# Patient Record
Sex: MALE | Race: WHITE | NOT HISPANIC OR LATINO | Employment: OTHER
[De-identification: names, ages, dates, MRNs, and addresses within clinical notes are randomized per-mention and may not be internally consistent; named-entity substitution may affect disease eponyms.]

---

## 2017-03-27 ENCOUNTER — CHARTING TRANS (OUTPATIENT)
Dept: OTHER | Age: 77
End: 2017-03-27

## 2017-03-31 ENCOUNTER — LAB SERVICES (OUTPATIENT)
Dept: OTHER | Age: 77
End: 2017-03-31

## 2017-03-31 LAB
ALBUMIN SERPL BCG-MCNC: 4.6 G/DL (ref 3.6–5.1)
ALP SERPL-CCNC: 88 U/L (ref 30–130)
ALT SERPL W/O P-5'-P-CCNC: 20 U/L (ref 17–47)
AST SERPL-CCNC: 29 U/L (ref 14–43)
BASOPHIL %: 0.4 % (ref 0–1.2)
BASOPHIL ABSOLUTE #: 0 10*3/UL (ref 0–0.1)
BILIRUB SERPL-MCNC: 1.1 MG/DL (ref 0–1.3)
BUN SERPL-MCNC: 24 MG/DL (ref 6–27)
CALCIUM SERPL-MCNC: 10.1 MG/DL (ref 8.6–10.6)
CHLORIDE SERPL-SCNC: 101 MMOL/L (ref 96–107)
CHOLEST SERPL-MCNC: 125 MG/DL (ref 140–200)
CREATININE, SERUM: 1.6 MG/DL (ref 0.6–1.6)
DIFFERENTIAL TYPE: ABNORMAL
EOSINOPHIL %: 2.3 % (ref 0–10)
EOSINOPHIL ABSOLUTE #: 0.2 10*3/UL (ref 0–0.5)
GFR SERPL CREATININE-BSD FRML MDRD: 42 ML/MIN/{1.73M2}
GFR SERPL CREATININE-BSD FRML MDRD: 51 ML/MIN/{1.73M2}
GLUCOSE P FAST SERPL-MCNC: 92 MG/DL (ref 60–100)
HCO3 SERPL-SCNC: 27 MMOL/L (ref 22–32)
HDLC SERPL-MCNC: 37 MG/DL
HEMATOCRIT: 48.6 % (ref 40–51)
HEMOGLOBIN: 16.1 G/DL (ref 13.7–17.5)
LDLC SERPL CALC-MCNC: 58 MG/DL (ref 30–100)
LYMPH PERCENT: 26.8 % (ref 20.5–51.1)
LYMPHOCYTE ABSOLUTE #: 2.6 10*3/UL (ref 1.2–3.4)
MEAN CORPUSCULAR HGB CONCENTRATION: 33.1 % (ref 32–36)
MEAN CORPUSCULAR HGB: 31.1 PG (ref 27–34)
MEAN CORPUSCULAR VOLUME: 93.8 FL (ref 79–95)
MEAN PLATELET VOLUME: 10.1 FL (ref 8.6–12.4)
MONOCYTE ABSOLUTE #: 1 10*3/UL (ref 0.2–0.9)
MONOCYTE PERCENT: 9.9 % (ref 4.3–12.9)
NEUTROPHIL ABSOLUTE #: 5.8 10*3/UL (ref 1.4–6.5)
NEUTROPHIL PERCENT: 60.6 % (ref 34–73.5)
PLATELET COUNT: 216 10*3/UL (ref 150–400)
POTASSIUM SERPL-SCNC: 4.6 MMOL/L (ref 3.5–5.3)
PROT SERPL-MCNC: 7.4 G/DL (ref 6.4–8.5)
RED BLOOD CELL COUNT: 5.18 10*6/UL (ref 3.9–5.7)
RED CELL DISTRIBUTION WIDTH: 13.7 % (ref 11.3–14.8)
SODIUM SERPL-SCNC: 142 MMOL/L (ref 136–146)
TRIGL SERPL-MCNC: 150 MG/DL (ref 0–200)
TSH SERPL DL<=0.05 MIU/L-ACNC: 1.39 M[IU]/L (ref 0.3–4.82)
VIT B12 SERPL-MCNC: 654 PG/ML (ref 193–982)
WHITE BLOOD CELL COUNT: 9.6 10*3/UL (ref 4–10)

## 2017-04-05 ENCOUNTER — CHARTING TRANS (OUTPATIENT)
Dept: INTERNAL MEDICINE | Age: 77
End: 2017-04-05

## 2017-05-06 ENCOUNTER — CHARTING TRANS (OUTPATIENT)
Dept: OTHER | Age: 77
End: 2017-05-06

## 2017-05-22 ENCOUNTER — CHARTING TRANS (OUTPATIENT)
Dept: OTHER | Age: 77
End: 2017-05-22

## 2017-05-30 ENCOUNTER — CHARTING TRANS (OUTPATIENT)
Dept: CARDIOLOGY | Age: 77
End: 2017-05-30

## 2017-05-30 ENCOUNTER — IMAGING SERVICES (OUTPATIENT)
Dept: OTHER | Age: 77
End: 2017-05-30

## 2017-05-31 ENCOUNTER — CHARTING TRANS (OUTPATIENT)
Dept: OTHER | Age: 77
End: 2017-05-31

## 2017-06-23 ENCOUNTER — IMAGING SERVICES (OUTPATIENT)
Dept: OTHER | Age: 77
End: 2017-06-23

## 2017-06-23 ENCOUNTER — CHARTING TRANS (OUTPATIENT)
Dept: OTHER | Age: 77
End: 2017-06-23

## 2017-06-26 ENCOUNTER — CHARTING TRANS (OUTPATIENT)
Dept: OTHER | Age: 77
End: 2017-06-26

## 2017-07-17 ENCOUNTER — IMAGING SERVICES (OUTPATIENT)
Dept: OTHER | Age: 77
End: 2017-07-17

## 2017-07-24 ENCOUNTER — CHARTING TRANS (OUTPATIENT)
Dept: OTHER | Age: 77
End: 2017-07-24

## 2017-08-03 ENCOUNTER — CHARTING TRANS (OUTPATIENT)
Dept: OTHER | Age: 77
End: 2017-08-03

## 2017-09-06 ENCOUNTER — LAB SERVICES (OUTPATIENT)
Dept: OTHER | Age: 77
End: 2017-09-06

## 2017-09-06 LAB
ALBUMIN SERPL BCG-MCNC: 4.5 G/DL (ref 3.6–5.1)
ALP SERPL-CCNC: 78 U/L (ref 30–130)
ALT SERPL W/O P-5'-P-CCNC: 16 U/L (ref 17–47)
AST SERPL-CCNC: 28 U/L (ref 14–43)
BILIRUB SERPL-MCNC: 0.8 MG/DL (ref 0–1.3)
BUN SERPL-MCNC: 23 MG/DL (ref 6–27)
CALCIUM SERPL-MCNC: 10 MG/DL (ref 8.6–10.6)
CHLORIDE SERPL-SCNC: 105 MMOL/L (ref 96–107)
CHOLEST SERPL-MCNC: 114 MG/DL (ref 140–200)
CREATININE, SERUM: 1.4 MG/DL (ref 0.6–1.6)
GFR SERPL CREATININE-BSD FRML MDRD: 49 ML/MIN/{1.73M2}
GFR SERPL CREATININE-BSD FRML MDRD: 60 ML/MIN/{1.73M2}
GLUCOSE P FAST SERPL-MCNC: 94 MG/DL (ref 60–100)
HCO3 SERPL-SCNC: 27 MMOL/L (ref 22–32)
HDLC SERPL-MCNC: 35 MG/DL
LDLC SERPL CALC-MCNC: 51 MG/DL (ref 30–100)
POTASSIUM SERPL-SCNC: 4.5 MMOL/L (ref 3.5–5.3)
PROT SERPL-MCNC: 7 G/DL (ref 6.4–8.5)
SODIUM SERPL-SCNC: 143 MMOL/L (ref 136–146)
TRIGL SERPL-MCNC: 139 MG/DL (ref 0–200)

## 2017-09-25 ENCOUNTER — CHARTING TRANS (OUTPATIENT)
Dept: OTHER | Age: 77
End: 2017-09-25

## 2017-09-27 ENCOUNTER — CHARTING TRANS (OUTPATIENT)
Dept: INTERNAL MEDICINE | Age: 77
End: 2017-09-27

## 2017-10-30 ENCOUNTER — CHARTING TRANS (OUTPATIENT)
Dept: OTHER | Age: 77
End: 2017-10-30

## 2017-11-16 ENCOUNTER — CHARTING TRANS (OUTPATIENT)
Dept: OTHER | Age: 77
End: 2017-11-16

## 2017-11-20 ENCOUNTER — CHARTING TRANS (OUTPATIENT)
Dept: OTHER | Age: 77
End: 2017-11-20

## 2017-12-01 ENCOUNTER — CHARTING TRANS (OUTPATIENT)
Dept: OTHER | Age: 77
End: 2017-12-01

## 2017-12-13 ENCOUNTER — CHARTING TRANS (OUTPATIENT)
Dept: OTHER | Age: 77
End: 2017-12-13

## 2018-03-15 ENCOUNTER — LAB SERVICES (OUTPATIENT)
Dept: OTHER | Age: 78
End: 2018-03-15

## 2018-03-15 LAB
ALBUMIN SERPL BCG-MCNC: 4.5 G/DL (ref 3.6–5.1)
ALP SERPL-CCNC: 82 U/L (ref 30–130)
ALT SERPL W/O P-5'-P-CCNC: 16 U/L (ref 17–47)
AST SERPL-CCNC: 26 U/L (ref 14–43)
BILIRUB SERPL-MCNC: 1.1 MG/DL (ref 0–1.3)
BUN SERPL-MCNC: 27 MG/DL (ref 6–27)
CALCIUM SERPL-MCNC: 10.2 MG/DL (ref 8.6–10.6)
CHLORIDE SERPL-SCNC: 105 MMOL/L (ref 96–107)
CHOLEST SERPL-MCNC: 131 MG/DL (ref 140–200)
CREATININE, SERUM: 1.6 MG/DL (ref 0.6–1.6)
GFR SERPL CREATININE-BSD FRML MDRD: 42 ML/MIN/{1.73M2}
GFR SERPL CREATININE-BSD FRML MDRD: 51 ML/MIN/{1.73M2}
GLUCOSE P FAST SERPL-MCNC: 94 MG/DL (ref 60–100)
HCO3 SERPL-SCNC: 27 MMOL/L (ref 22–32)
HDLC SERPL-MCNC: 36 MG/DL
LDLC SERPL CALC-MCNC: 68 MG/DL (ref 30–100)
POTASSIUM SERPL-SCNC: 4.7 MMOL/L (ref 3.5–5.3)
PROT SERPL-MCNC: 7 G/DL (ref 6.4–8.5)
PSA SERPL-MCNC: 0.94 NG/ML (ref 0–6.8)
SODIUM SERPL-SCNC: 145 MMOL/L (ref 136–146)
TRIGL SERPL-MCNC: 134 MG/DL (ref 0–200)

## 2018-03-27 ENCOUNTER — CHARTING TRANS (OUTPATIENT)
Dept: OTHER | Age: 78
End: 2018-03-27

## 2018-04-04 ENCOUNTER — CHARTING TRANS (OUTPATIENT)
Dept: OTHER | Age: 78
End: 2018-04-04

## 2018-05-01 ENCOUNTER — LAB SERVICES (OUTPATIENT)
Dept: OTHER | Age: 78
End: 2018-05-01

## 2018-05-01 LAB — HEMOCCULT STL QL IA: POSITIVE

## 2018-06-01 ENCOUNTER — IMAGING SERVICES (OUTPATIENT)
Dept: OTHER | Age: 78
End: 2018-06-01

## 2018-06-01 ENCOUNTER — CHARTING TRANS (OUTPATIENT)
Dept: OTHER | Age: 78
End: 2018-06-01

## 2018-06-01 ENCOUNTER — MYAURORA ACCOUNT LINK (OUTPATIENT)
Dept: OTHER | Age: 78
End: 2018-06-01

## 2018-06-27 ENCOUNTER — CHARTING TRANS (OUTPATIENT)
Dept: OTHER | Age: 78
End: 2018-06-27

## 2018-06-27 ENCOUNTER — MYAURORA ACCOUNT LINK (OUTPATIENT)
Dept: OTHER | Age: 78
End: 2018-06-27

## 2018-08-10 ENCOUNTER — CHARTING TRANS (OUTPATIENT)
Dept: OTHER | Age: 78
End: 2018-08-10

## 2018-08-21 ENCOUNTER — CHARTING TRANS (OUTPATIENT)
Dept: OTHER | Age: 78
End: 2018-08-21

## 2018-09-18 ENCOUNTER — CHARTING TRANS (OUTPATIENT)
Dept: OTHER | Age: 78
End: 2018-09-18

## 2018-10-08 ENCOUNTER — IMAGING SERVICES (OUTPATIENT)
Dept: OTHER | Age: 78
End: 2018-10-08

## 2018-10-08 ENCOUNTER — LAB SERVICES (OUTPATIENT)
Dept: OTHER | Age: 78
End: 2018-10-08

## 2018-10-08 LAB
BASOPHIL %: 0.4 % (ref 0–1.2)
BASOPHIL ABSOLUTE #: 0 10*3/UL (ref 0–0.1)
DIFFERENTIAL TYPE: NORMAL
EOSINOPHIL %: 4 % (ref 0–10)
EOSINOPHIL ABSOLUTE #: 0.4 10*3/UL (ref 0–0.5)
HEMATOCRIT: 45.4 % (ref 40–51)
HEMOGLOBIN: 15 G/DL (ref 13.7–17.5)
LYMPH PERCENT: 25.8 % (ref 20.5–51.1)
LYMPHOCYTE ABSOLUTE #: 2.3 10*3/UL (ref 1.2–3.4)
MEAN CORPUSCULAR HGB CONCENTRATION: 33 % (ref 32–36)
MEAN CORPUSCULAR HGB: 31.4 PG (ref 27–34)
MEAN CORPUSCULAR VOLUME: 95 FL (ref 79–95)
MEAN PLATELET VOLUME: 10.7 FL (ref 8.6–12.4)
MONOCYTE ABSOLUTE #: 0.9 10*3/UL (ref 0.2–0.9)
MONOCYTE PERCENT: 10.1 % (ref 4.3–12.9)
NEUTROPHIL ABSOLUTE #: 5.3 10*3/UL (ref 1.4–6.5)
NEUTROPHIL PERCENT: 59.7 % (ref 34–73.5)
PLATELET COUNT: 221 10*3/UL (ref 150–400)
RED BLOOD CELL COUNT: 4.78 10*6/UL (ref 3.9–5.7)
RED CELL DISTRIBUTION WIDTH: 13.9 % (ref 11.3–14.8)
WHITE BLOOD CELL COUNT: 8.9 10*3/UL (ref 4–10)

## 2018-10-09 ENCOUNTER — CHARTING TRANS (OUTPATIENT)
Dept: OTHER | Age: 78
End: 2018-10-09

## 2018-10-09 LAB
BUN SERPL-MCNC: 32 MG/DL (ref 6–27)
CALCIUM SERPL-MCNC: 10.1 MG/DL (ref 8.6–10.6)
CHLORIDE SERPL-SCNC: 105 MMOL/L (ref 96–107)
CO2 SERPL-SCNC: 22 MMOL/L (ref 22–32)
CREAT SERPL-MCNC: 1.7 MG/DL (ref 0.6–1.6)
GFR SERPL CREATININE-BSD FRML MDRD: 39 ML/MIN/{1.73M2}
GFR SERPL CREATININE-BSD FRML MDRD: 48 ML/MIN/{1.73M2}
GLUCOSE SERPL-MCNC: 118 MG/DL (ref 70–200)
POTASSIUM SERPL-SCNC: 5 MMOL/L (ref 3.5–5.3)
SODIUM SERPL-SCNC: 143 MMOL/L (ref 136–146)

## 2018-10-11 ENCOUNTER — CHARTING TRANS (OUTPATIENT)
Dept: OTHER | Age: 78
End: 2018-10-11

## 2018-10-15 ENCOUNTER — MYAURORA ACCOUNT LINK (OUTPATIENT)
Dept: OTHER | Age: 78
End: 2018-10-15

## 2018-10-29 ENCOUNTER — CHARTING TRANS (OUTPATIENT)
Dept: OTHER | Age: 78
End: 2018-10-29

## 2018-11-05 ENCOUNTER — CHARTING TRANS (OUTPATIENT)
Dept: OTHER | Age: 78
End: 2018-11-05

## 2018-11-23 ENCOUNTER — IMAGING SERVICES (OUTPATIENT)
Dept: OTHER | Age: 78
End: 2018-11-23

## 2018-11-28 VITALS
WEIGHT: 162 LBS | HEART RATE: 80 BPM | SYSTOLIC BLOOD PRESSURE: 102 MMHG | OXYGEN SATURATION: 96 % | HEIGHT: 69 IN | BODY MASS INDEX: 23.99 KG/M2 | DIASTOLIC BLOOD PRESSURE: 70 MMHG

## 2018-11-28 VITALS — WEIGHT: 163 LBS | HEART RATE: 76 BPM | SYSTOLIC BLOOD PRESSURE: 120 MMHG | DIASTOLIC BLOOD PRESSURE: 68 MMHG

## 2018-11-30 VITALS
RESPIRATION RATE: 16 BRPM | WEIGHT: 161 LBS | SYSTOLIC BLOOD PRESSURE: 116 MMHG | DIASTOLIC BLOOD PRESSURE: 74 MMHG | OXYGEN SATURATION: 97 % | BODY MASS INDEX: 23.85 KG/M2 | HEIGHT: 69 IN | HEART RATE: 76 BPM

## 2018-12-12 RX ORDER — FINASTERIDE 5 MG/1
TABLET, FILM COATED ORAL
Qty: 30 TABLET | Refills: 0 | Status: SHIPPED | OUTPATIENT
Start: 2018-12-12 | End: 2019-01-08 | Stop reason: SDUPTHER

## 2018-12-12 RX ORDER — TAMSULOSIN HYDROCHLORIDE 0.4 MG/1
CAPSULE ORAL
Qty: 90 CAPSULE | Refills: 0 | Status: SHIPPED | OUTPATIENT
Start: 2018-12-12 | End: 2019-03-08 | Stop reason: SDUPTHER

## 2018-12-17 DIAGNOSIS — N18.9 CHRONIC KIDNEY DISEASE, UNSPECIFIED CKD STAGE: ICD-10-CM

## 2018-12-17 DIAGNOSIS — E78.5 HYPERLIPIDEMIA, UNSPECIFIED HYPERLIPIDEMIA TYPE: ICD-10-CM

## 2018-12-17 DIAGNOSIS — I10 ESSENTIAL HYPERTENSION: Primary | ICD-10-CM

## 2019-01-01 ENCOUNTER — EXTERNAL RECORD (OUTPATIENT)
Dept: HEALTH INFORMATION MANAGEMENT | Facility: OTHER | Age: 79
End: 2019-01-01

## 2019-01-12 RX ORDER — FINASTERIDE 5 MG/1
TABLET, FILM COATED ORAL
Qty: 30 TABLET | Refills: 0 | Status: SHIPPED | OUTPATIENT
Start: 2019-01-12 | End: 2019-02-12 | Stop reason: SDUPTHER

## 2019-01-15 RX ORDER — FINASTERIDE 5 MG/1
TABLET, FILM COATED ORAL
Qty: 30 TABLET | Refills: 0 | Status: SHIPPED | OUTPATIENT
Start: 2019-01-15 | End: 2019-02-10 | Stop reason: SDUPTHER

## 2019-02-05 ENCOUNTER — LAB SERVICES (OUTPATIENT)
Dept: LAB | Age: 79
End: 2019-02-05

## 2019-02-05 DIAGNOSIS — I10 ESSENTIAL HYPERTENSION: ICD-10-CM

## 2019-02-05 DIAGNOSIS — E78.5 HYPERLIPIDEMIA, UNSPECIFIED HYPERLIPIDEMIA TYPE: ICD-10-CM

## 2019-02-05 DIAGNOSIS — N18.9 CHRONIC KIDNEY DISEASE, UNSPECIFIED CKD STAGE: ICD-10-CM

## 2019-02-05 LAB
ALBUMIN SERPL-MCNC: 4.6 G/DL (ref 3.6–5.1)
ALP SERPL-CCNC: 94 U/L (ref 45–115)
ALT SERPL W/O P-5'-P-CCNC: 5 U/L (ref 5–49)
AST SERPL-CCNC: 25 U/L (ref 14–43)
BILIRUB SERPL-MCNC: 1 MG/DL (ref 0–1.3)
BUN SERPL-MCNC: 27 MG/DL (ref 6–27)
CALCIUM SERPL-MCNC: 10.1 MG/DL (ref 8.6–10.6)
CHLORIDE SERPL-SCNC: 103 MMOL/L (ref 96–107)
CHOLEST SERPL-MCNC: 116 MG/DL (ref 140–200)
CO2 SERPL-SCNC: 28 MMOL/L (ref 22–32)
CREAT SERPL-MCNC: 1.7 MG/DL (ref 0.6–1.6)
GFR SERPL CREATININE-BSD FRML MDRD: 39 ML/MIN/{1.73M2}
GFR SERPL CREATININE-BSD FRML MDRD: 47 ML/MIN/{1.73M2}
GLUCOSE P FAST SERPL-MCNC: 86 MG/DL (ref 60–100)
HDLC SERPL-MCNC: 38 MG/DL
LDLC SERPL CALC-MCNC: 58 MG/DL (ref 30–100)
POTASSIUM SERPL-SCNC: 4.9 MMOL/L (ref 3.5–5.3)
PROT SERPL-MCNC: 7.3 G/DL (ref 6.4–8.5)
SODIUM SERPL-SCNC: 140 MMOL/L (ref 136–146)
TRIGL SERPL-MCNC: 100 MG/DL (ref 0–200)
TSH SERPL DL<=0.05 MIU/L-ACNC: 1.66 M[IU]/L (ref 0.3–4.82)

## 2019-02-05 PROCEDURE — 84443 ASSAY THYROID STIM HORMONE: CPT | Performed by: INTERNAL MEDICINE

## 2019-02-05 PROCEDURE — 36415 COLL VENOUS BLD VENIPUNCTURE: CPT | Performed by: INTERNAL MEDICINE

## 2019-02-05 PROCEDURE — 80053 COMPREHEN METABOLIC PANEL: CPT | Performed by: INTERNAL MEDICINE

## 2019-02-05 PROCEDURE — 80061 LIPID PANEL: CPT | Performed by: INTERNAL MEDICINE

## 2019-02-06 ENCOUNTER — E-ADVICE (OUTPATIENT)
Dept: CARDIOLOGY | Age: 79
End: 2019-02-06

## 2019-02-11 RX ORDER — ASPIRIN 325 MG
325 TABLET, DELAYED RELEASE (ENTERIC COATED) ORAL EVERY OTHER DAY
COMMUNITY
End: 2020-10-27 | Stop reason: SDUPTHER

## 2019-02-11 RX ORDER — BENAZEPRIL HYDROCHLORIDE 5 MG/1
TABLET, FILM COATED ORAL
COMMUNITY
Start: 2018-10-09 | End: 2019-06-07 | Stop reason: ALTCHOICE

## 2019-02-11 RX ORDER — ATORVASTATIN CALCIUM 10 MG/1
TABLET, FILM COATED ORAL
COMMUNITY
Start: 2018-11-05 | End: 2019-10-26 | Stop reason: SDUPTHER

## 2019-02-12 RX ORDER — FINASTERIDE 5 MG/1
TABLET, FILM COATED ORAL
Qty: 30 TABLET | Refills: 0 | Status: SHIPPED | OUTPATIENT
Start: 2019-02-12 | End: 2019-03-11 | Stop reason: SDUPTHER

## 2019-02-20 ENCOUNTER — OFFICE VISIT (OUTPATIENT)
Dept: INTERNAL MEDICINE | Age: 79
End: 2019-02-20

## 2019-02-20 VITALS
OXYGEN SATURATION: 95 % | DIASTOLIC BLOOD PRESSURE: 80 MMHG | BODY MASS INDEX: 22.89 KG/M2 | SYSTOLIC BLOOD PRESSURE: 118 MMHG | HEART RATE: 72 BPM | WEIGHT: 155 LBS

## 2019-02-20 DIAGNOSIS — E78.49 OTHER HYPERLIPIDEMIA: ICD-10-CM

## 2019-02-20 DIAGNOSIS — N13.8 BENIGN PROSTATIC HYPERPLASIA WITH URINARY OBSTRUCTION: ICD-10-CM

## 2019-02-20 DIAGNOSIS — N18.9 CHRONIC KIDNEY DISEASE, UNSPECIFIED CKD STAGE: ICD-10-CM

## 2019-02-20 DIAGNOSIS — N40.1 BENIGN PROSTATIC HYPERPLASIA WITH URINARY OBSTRUCTION: ICD-10-CM

## 2019-02-20 DIAGNOSIS — I10 ESSENTIAL HYPERTENSION: Primary | ICD-10-CM

## 2019-02-20 PROCEDURE — 3074F SYST BP LT 130 MM HG: CPT | Performed by: INTERNAL MEDICINE

## 2019-02-20 PROCEDURE — 99214 OFFICE O/P EST MOD 30 MIN: CPT | Performed by: INTERNAL MEDICINE

## 2019-02-20 PROCEDURE — 3079F DIAST BP 80-89 MM HG: CPT | Performed by: INTERNAL MEDICINE

## 2019-02-20 RX ORDER — RIVASTIGMINE TARTRATE 1.5 MG/1
CAPSULE ORAL
COMMUNITY
Start: 2019-02-18 | End: 2019-06-07 | Stop reason: ALTCHOICE

## 2019-02-20 RX ORDER — ASCORBIC ACID 250 MG
500 TABLET ORAL DAILY
COMMUNITY

## 2019-02-27 ENCOUNTER — TELEPHONE (OUTPATIENT)
Dept: INTERNAL MEDICINE | Age: 79
End: 2019-02-27

## 2019-03-05 VITALS
BODY MASS INDEX: 23.78 KG/M2 | HEART RATE: 84 BPM | WEIGHT: 161 LBS | DIASTOLIC BLOOD PRESSURE: 74 MMHG | TEMPERATURE: 97 F | SYSTOLIC BLOOD PRESSURE: 118 MMHG

## 2019-03-06 VITALS
SYSTOLIC BLOOD PRESSURE: 124 MMHG | DIASTOLIC BLOOD PRESSURE: 80 MMHG | TEMPERATURE: 98 F | BODY MASS INDEX: 23.78 KG/M2 | HEART RATE: 80 BPM | WEIGHT: 161 LBS

## 2019-03-06 VITALS
DIASTOLIC BLOOD PRESSURE: 74 MMHG | OXYGEN SATURATION: 95 % | WEIGHT: 159 LBS | RESPIRATION RATE: 16 BRPM | SYSTOLIC BLOOD PRESSURE: 114 MMHG | HEIGHT: 69 IN | BODY MASS INDEX: 23.55 KG/M2 | HEART RATE: 73 BPM

## 2019-03-06 PROBLEM — K92.1 BLOOD IN STOOL: Status: ACTIVE | Noted: 2019-03-06

## 2019-03-09 ENCOUNTER — E-ADVICE (OUTPATIENT)
Dept: INTERNAL MEDICINE | Age: 79
End: 2019-03-09

## 2019-03-09 RX ORDER — TAMSULOSIN HYDROCHLORIDE 0.4 MG/1
CAPSULE ORAL
Qty: 90 CAPSULE | Refills: 1 | Status: SHIPPED | OUTPATIENT
Start: 2019-03-09 | End: 2019-08-30 | Stop reason: SDUPTHER

## 2019-03-13 RX ORDER — FINASTERIDE 5 MG/1
TABLET, FILM COATED ORAL
Qty: 30 TABLET | Refills: 5 | Status: SHIPPED | OUTPATIENT
Start: 2019-03-13 | End: 2019-09-06 | Stop reason: SDUPTHER

## 2019-03-28 ENCOUNTER — TELEPHONE (OUTPATIENT)
Dept: SCHEDULING | Age: 79
End: 2019-03-28

## 2019-03-28 DIAGNOSIS — N40.1 BENIGN PROSTATIC HYPERPLASIA WITH URINARY OBSTRUCTION: ICD-10-CM

## 2019-03-28 DIAGNOSIS — G20.A1 PARKINSON'S DISEASE: ICD-10-CM

## 2019-03-28 DIAGNOSIS — N13.8 BENIGN PROSTATIC HYPERPLASIA WITH URINARY OBSTRUCTION: ICD-10-CM

## 2019-03-28 DIAGNOSIS — R41.0 CONFUSION: Primary | ICD-10-CM

## 2019-03-29 ENCOUNTER — LAB SERVICES (OUTPATIENT)
Dept: LAB | Age: 79
End: 2019-03-29

## 2019-03-29 DIAGNOSIS — R41.0 CONFUSION: Primary | ICD-10-CM

## 2019-03-29 DIAGNOSIS — N40.1 BENIGN PROSTATIC HYPERPLASIA WITH LOWER URINARY TRACT SYMPTOMS, SYMPTOM DETAILS UNSPECIFIED: ICD-10-CM

## 2019-06-07 ENCOUNTER — OFFICE VISIT (OUTPATIENT)
Dept: CARDIOLOGY | Age: 79
End: 2019-06-07

## 2019-06-07 VITALS
DIASTOLIC BLOOD PRESSURE: 64 MMHG | WEIGHT: 160 LBS | HEIGHT: 68 IN | BODY MASS INDEX: 24.25 KG/M2 | SYSTOLIC BLOOD PRESSURE: 110 MMHG | HEART RATE: 77 BPM | OXYGEN SATURATION: 97 % | RESPIRATION RATE: 18 BRPM

## 2019-06-07 DIAGNOSIS — I10 ESSENTIAL HYPERTENSION: ICD-10-CM

## 2019-06-07 DIAGNOSIS — I51.89 DIASTOLIC DYSFUNCTION: ICD-10-CM

## 2019-06-07 DIAGNOSIS — E78.49 OTHER HYPERLIPIDEMIA: ICD-10-CM

## 2019-06-07 DIAGNOSIS — N18.30 STAGE 3 CHRONIC KIDNEY DISEASE (CMD): ICD-10-CM

## 2019-06-07 DIAGNOSIS — G20.A1 PARKINSON'S DISEASE: ICD-10-CM

## 2019-06-07 DIAGNOSIS — R93.1 AGATSTON CORONARY ARTERY CALCIUM SCORE GREATER THAN 400: Primary | ICD-10-CM

## 2019-06-07 DIAGNOSIS — I71.21 ASCENDING AORTIC ANEURYSM (CMD): ICD-10-CM

## 2019-06-07 PROCEDURE — 3074F SYST BP LT 130 MM HG: CPT | Performed by: INTERNAL MEDICINE

## 2019-06-07 PROCEDURE — 93000 ELECTROCARDIOGRAM COMPLETE: CPT | Performed by: INTERNAL MEDICINE

## 2019-06-07 PROCEDURE — 3078F DIAST BP <80 MM HG: CPT | Performed by: INTERNAL MEDICINE

## 2019-06-07 PROCEDURE — 99214 OFFICE O/P EST MOD 30 MIN: CPT | Performed by: INTERNAL MEDICINE

## 2019-06-17 ENCOUNTER — ANCILLARY PROCEDURE (OUTPATIENT)
Dept: CARDIOLOGY | Age: 79
End: 2019-06-17
Attending: INTERNAL MEDICINE

## 2019-06-17 DIAGNOSIS — I10 ESSENTIAL HYPERTENSION: ICD-10-CM

## 2019-06-17 DIAGNOSIS — I51.89 DIASTOLIC DYSFUNCTION: ICD-10-CM

## 2019-06-17 DIAGNOSIS — I71.21 ASCENDING AORTIC ANEURYSM (CMD): ICD-10-CM

## 2019-06-17 DIAGNOSIS — E78.49 OTHER HYPERLIPIDEMIA: ICD-10-CM

## 2019-06-17 DIAGNOSIS — R93.1 AGATSTON CORONARY ARTERY CALCIUM SCORE GREATER THAN 400: ICD-10-CM

## 2019-06-17 PROCEDURE — 93306 TTE W/DOPPLER COMPLETE: CPT | Performed by: INTERNAL MEDICINE

## 2019-06-19 ENCOUNTER — TELEPHONE (OUTPATIENT)
Dept: CARDIOLOGY | Age: 79
End: 2019-06-19

## 2019-07-02 ENCOUNTER — OFFICE VISIT (OUTPATIENT)
Dept: DERMATOLOGY | Age: 79
End: 2019-07-02

## 2019-07-02 DIAGNOSIS — L82.1 SEBORRHEIC KERATOSIS: Primary | ICD-10-CM

## 2019-07-02 DIAGNOSIS — Z12.83 SCREENING EXAM FOR SKIN CANCER: ICD-10-CM

## 2019-07-02 PROCEDURE — 99213 OFFICE O/P EST LOW 20 MIN: CPT | Performed by: DERMATOLOGY

## 2019-08-01 ENCOUNTER — TELEPHONE (OUTPATIENT)
Dept: INTERNAL MEDICINE | Age: 79
End: 2019-08-01

## 2019-08-08 ENCOUNTER — LAB SERVICES (OUTPATIENT)
Dept: LAB | Age: 79
End: 2019-08-08

## 2019-08-08 DIAGNOSIS — E78.49 OTHER HYPERLIPIDEMIA: ICD-10-CM

## 2019-08-08 DIAGNOSIS — I10 ESSENTIAL HYPERTENSION: ICD-10-CM

## 2019-08-08 DIAGNOSIS — N18.9 CHRONIC KIDNEY DISEASE, UNSPECIFIED CKD STAGE: ICD-10-CM

## 2019-08-08 LAB
BASOPHIL %: 0.7 % (ref 0–1.2)
BASOPHIL ABSOLUTE #: 0.1 10*3/UL (ref 0–0.1)
DIFFERENTIAL TYPE: NORMAL
EOSINOPHIL %: 6.2 % (ref 0–10)
EOSINOPHIL ABSOLUTE #: 0.5 10*3/UL (ref 0–0.5)
HEMATOCRIT: 46.9 % (ref 40–51)
HEMOGLOBIN: 15.1 G/DL (ref 13.7–17.5)
LYMPH PERCENT: 27.1 % (ref 20.5–51.1)
LYMPHOCYTE ABSOLUTE #: 2.4 10*3/UL (ref 1.2–3.4)
MEAN CORPUSCULAR HGB CONCENTRATION: 32.2 % (ref 32–36)
MEAN CORPUSCULAR HGB: 29.8 PG (ref 27–34)
MEAN CORPUSCULAR VOLUME: 92.5 FL (ref 79–95)
MEAN PLATELET VOLUME: 10.1 FL (ref 8.6–12.4)
MONOCYTE ABSOLUTE #: 0.9 10*3/UL (ref 0.2–0.9)
MONOCYTE PERCENT: 10.6 % (ref 4.3–12.9)
NEUTROPHIL ABSOLUTE #: 4.8 10*3/UL (ref 1.4–6.5)
NEUTROPHIL PERCENT: 55.4 % (ref 34–73.5)
PLATELET COUNT: 214 10*3/UL (ref 150–400)
RED BLOOD CELL COUNT: 5.07 10*6/UL (ref 3.9–5.7)
RED CELL DISTRIBUTION WIDTH: 13.3 % (ref 11.3–14.8)
WHITE BLOOD CELL COUNT: 8.7 10*3/UL (ref 4–10)

## 2019-08-08 PROCEDURE — 85025 COMPLETE CBC W/AUTO DIFF WBC: CPT | Performed by: INTERNAL MEDICINE

## 2019-08-08 PROCEDURE — 80048 BASIC METABOLIC PNL TOTAL CA: CPT | Performed by: INTERNAL MEDICINE

## 2019-08-08 PROCEDURE — 80061 LIPID PANEL: CPT | Performed by: INTERNAL MEDICINE

## 2019-08-08 PROCEDURE — 36415 COLL VENOUS BLD VENIPUNCTURE: CPT | Performed by: INTERNAL MEDICINE

## 2019-08-08 PROCEDURE — 80076 HEPATIC FUNCTION PANEL: CPT | Performed by: INTERNAL MEDICINE

## 2019-08-09 LAB
ALBUMIN SERPL-MCNC: 4.3 G/DL (ref 3.6–5.1)
ALP SERPL-CCNC: 88 U/L (ref 45–115)
ALT SERPL W/O P-5'-P-CCNC: 8 U/L (ref 5–49)
AST SERPL-CCNC: 25 U/L (ref 14–43)
BILIRUB CONJ SERPL-MCNC: 0 MG/DL (ref 0–0.3)
BILIRUB SERPL-MCNC: 0.8 MG/DL (ref 0–1.3)
BUN SERPL-MCNC: 22 MG/DL (ref 6–27)
CALCIUM SERPL-MCNC: 9.8 MG/DL (ref 8.6–10.6)
CHLORIDE SERPL-SCNC: 105 MMOL/L (ref 96–107)
CHOLEST SERPL-MCNC: 113 MG/DL (ref 140–200)
CO2 SERPL-SCNC: 26 MMOL/L (ref 22–32)
CREAT SERPL-MCNC: 1.4 MG/DL (ref 0.6–1.6)
GFR SERPL CREATININE-BSD FRML MDRD: 49 ML/MIN/{1.73M2}
GFR SERPL CREATININE-BSD FRML MDRD: 59 ML/MIN/{1.73M2}
GLUCOSE P FAST SERPL-MCNC: 88 MG/DL (ref 60–100)
HDLC SERPL-MCNC: 38 MG/DL
LDLC SERPL CALC-MCNC: 56 MG/DL (ref 30–100)
POTASSIUM SERPL-SCNC: 4.4 MMOL/L (ref 3.5–5.3)
PROT SERPL-MCNC: 6.7 G/DL (ref 6.4–8.5)
SODIUM SERPL-SCNC: 141 MMOL/L (ref 136–146)
TRIGL SERPL-MCNC: 93 MG/DL (ref 0–200)

## 2019-08-13 ENCOUNTER — OFFICE VISIT (OUTPATIENT)
Dept: PODIATRY CLINIC | Facility: CLINIC | Age: 79
End: 2019-08-13
Payer: COMMERCIAL

## 2019-08-13 DIAGNOSIS — M21.6X9 PLANTAR FLEXED METATARSAL, UNSPECIFIED LATERALITY: ICD-10-CM

## 2019-08-13 DIAGNOSIS — G20 PARKINSON DISEASE (HCC): Primary | ICD-10-CM

## 2019-08-13 DIAGNOSIS — M20.41 HAMMER TOES OF BOTH FEET: ICD-10-CM

## 2019-08-13 DIAGNOSIS — B35.1 ONYCHOMYCOSIS: ICD-10-CM

## 2019-08-13 DIAGNOSIS — M20.42 HAMMER TOES OF BOTH FEET: ICD-10-CM

## 2019-08-13 PROCEDURE — 99213 OFFICE O/P EST LOW 20 MIN: CPT | Performed by: PODIATRIST

## 2019-08-14 NOTE — PROGRESS NOTES
Constance Sims is a 67year old male.  Patient presents with:  Toenail Care: Routine trimming  Callus: Left foot side        HPI:     Presents today for follow-up evaluation he has underlying Parkinson's and I am able to provide any sort of self-care to hi on file      Number of children: Not on file      Years of education: Not on file      Highest education level: Not on file    Tobacco Use      Smoking status: Never Smoker      Smokeless tobacco: Never Used    Substance and Sexual Activity      Alcohol us thick, dystrophic with accumulation of subungual debris, causing impaction, and pain with ambulation. Today, those nails were debrided back to as healthy tissue as normal, being reduced by both manual and mechanical means.   Again he was informed about spec

## 2019-08-21 ENCOUNTER — OFFICE VISIT (OUTPATIENT)
Dept: INTERNAL MEDICINE | Age: 79
End: 2019-08-21

## 2019-08-21 VITALS
WEIGHT: 163 LBS | HEART RATE: 84 BPM | OXYGEN SATURATION: 96 % | DIASTOLIC BLOOD PRESSURE: 80 MMHG | SYSTOLIC BLOOD PRESSURE: 142 MMHG | BODY MASS INDEX: 24.78 KG/M2

## 2019-08-21 DIAGNOSIS — I51.89 DIASTOLIC DYSFUNCTION: ICD-10-CM

## 2019-08-21 DIAGNOSIS — R41.0 DELIRIUM: ICD-10-CM

## 2019-08-21 DIAGNOSIS — G20.A1 PARKINSON'S DISEASE: ICD-10-CM

## 2019-08-21 DIAGNOSIS — I10 ESSENTIAL HYPERTENSION: Primary | ICD-10-CM

## 2019-08-21 DIAGNOSIS — N18.9 CHRONIC KIDNEY DISEASE, UNSPECIFIED CKD STAGE: ICD-10-CM

## 2019-08-21 DIAGNOSIS — E78.49 OTHER HYPERLIPIDEMIA: ICD-10-CM

## 2019-08-21 PROCEDURE — 99214 OFFICE O/P EST MOD 30 MIN: CPT | Performed by: INTERNAL MEDICINE

## 2019-08-27 ENCOUNTER — TELEPHONE (OUTPATIENT)
Dept: CARDIOLOGY | Age: 79
End: 2019-08-27

## 2019-08-30 RX ORDER — BENAZEPRIL HYDROCHLORIDE 5 MG/1
2.5 TABLET, FILM COATED ORAL DAILY
Qty: 45 TABLET | Refills: 3 | Status: SHIPPED | COMMUNITY
Start: 2019-08-30 | End: 2019-12-09 | Stop reason: SDUPTHER

## 2019-09-03 RX ORDER — TAMSULOSIN HYDROCHLORIDE 0.4 MG/1
CAPSULE ORAL
Qty: 90 CAPSULE | Refills: 1 | Status: SHIPPED | OUTPATIENT
Start: 2019-09-03 | End: 2020-02-28 | Stop reason: SDUPTHER

## 2019-09-09 RX ORDER — FINASTERIDE 5 MG/1
TABLET, FILM COATED ORAL
Qty: 30 TABLET | Refills: 2 | Status: SHIPPED | OUTPATIENT
Start: 2019-09-09 | End: 2019-12-01 | Stop reason: SDUPTHER

## 2019-10-28 RX ORDER — ATORVASTATIN CALCIUM 10 MG/1
TABLET, FILM COATED ORAL
Qty: 90 TABLET | Refills: 3 | Status: SHIPPED | OUTPATIENT
Start: 2019-10-28 | End: 2020-06-17 | Stop reason: SDUPTHER

## 2019-11-05 ENCOUNTER — OFFICE VISIT (OUTPATIENT)
Dept: PODIATRY CLINIC | Facility: CLINIC | Age: 79
End: 2019-11-05
Payer: COMMERCIAL

## 2019-11-05 DIAGNOSIS — M20.41 HAMMER TOES OF BOTH FEET: ICD-10-CM

## 2019-11-05 DIAGNOSIS — B35.1 ONYCHOMYCOSIS: ICD-10-CM

## 2019-11-05 DIAGNOSIS — M20.42 HAMMER TOES OF BOTH FEET: ICD-10-CM

## 2019-11-05 DIAGNOSIS — G20 PARKINSON DISEASE (HCC): Primary | ICD-10-CM

## 2019-11-05 DIAGNOSIS — M21.6X9 PLANTAR FLEXED METATARSAL, UNSPECIFIED LATERALITY: ICD-10-CM

## 2019-11-05 PROCEDURE — 11721 DEBRIDE NAIL 6 OR MORE: CPT | Performed by: PODIATRIST

## 2019-11-05 NOTE — PROGRESS NOTES
Amanda Hurtado is a 67year old male. Patient presents with:  Toenail Care: 67year old male unable to trim his own nails. Patient denies any pain or swelling.         HPI:     His wife he has underlying Parkinson's and cannot provide any sort of self-care Used    Substance and Sexual Activity      Alcohol use:  Yes        Alcohol/week: 3.0 standard drinks        Types: 2 Glasses of wine, 1 Cans of beer per week      Drug use: No          REVIEW OF SYSTEMS:   Review of Systems    Today reviewed systems as doc

## 2019-12-04 RX ORDER — FINASTERIDE 5 MG/1
TABLET, FILM COATED ORAL
Qty: 30 TABLET | Refills: 0 | Status: SHIPPED | OUTPATIENT
Start: 2019-12-04 | End: 2019-12-31 | Stop reason: SDUPTHER

## 2019-12-10 RX ORDER — BENAZEPRIL HYDROCHLORIDE 5 MG/1
2.5 TABLET, FILM COATED ORAL DAILY
Qty: 45 TABLET | Refills: 3 | Status: SHIPPED | OUTPATIENT
Start: 2019-12-10 | End: 2020-06-17 | Stop reason: SDUPTHER

## 2019-12-11 ENCOUNTER — OFFICE VISIT (OUTPATIENT)
Dept: INTERNAL MEDICINE | Age: 79
End: 2019-12-11

## 2019-12-11 VITALS
BODY MASS INDEX: 25.09 KG/M2 | DIASTOLIC BLOOD PRESSURE: 78 MMHG | HEART RATE: 80 BPM | SYSTOLIC BLOOD PRESSURE: 124 MMHG | WEIGHT: 165 LBS

## 2019-12-11 DIAGNOSIS — I10 ESSENTIAL HYPERTENSION: Primary | ICD-10-CM

## 2019-12-11 DIAGNOSIS — R35.0 BENIGN PROSTATIC HYPERPLASIA WITH URINARY FREQUENCY: ICD-10-CM

## 2019-12-11 DIAGNOSIS — N40.1 BENIGN PROSTATIC HYPERPLASIA WITH URINARY FREQUENCY: ICD-10-CM

## 2019-12-11 DIAGNOSIS — E78.49 OTHER HYPERLIPIDEMIA: ICD-10-CM

## 2019-12-11 PROCEDURE — 99214 OFFICE O/P EST MOD 30 MIN: CPT | Performed by: INTERNAL MEDICINE

## 2019-12-11 PROCEDURE — 3078F DIAST BP <80 MM HG: CPT | Performed by: INTERNAL MEDICINE

## 2019-12-11 PROCEDURE — 3074F SYST BP LT 130 MM HG: CPT | Performed by: INTERNAL MEDICINE

## 2019-12-31 RX ORDER — FINASTERIDE 5 MG/1
TABLET, FILM COATED ORAL
Qty: 30 TABLET | Refills: 5 | Status: SHIPPED | OUTPATIENT
Start: 2019-12-31 | End: 2020-06-17 | Stop reason: SDUPTHER

## 2020-01-28 ENCOUNTER — OFFICE VISIT (OUTPATIENT)
Dept: PODIATRY CLINIC | Facility: CLINIC | Age: 80
End: 2020-01-28
Payer: COMMERCIAL

## 2020-01-28 DIAGNOSIS — B35.1 ONYCHOMYCOSIS: ICD-10-CM

## 2020-01-28 DIAGNOSIS — M20.41 HAMMER TOES OF BOTH FEET: ICD-10-CM

## 2020-01-28 DIAGNOSIS — M21.6X9 PLANTAR FLEXED METATARSAL, UNSPECIFIED LATERALITY: ICD-10-CM

## 2020-01-28 DIAGNOSIS — G20 PARKINSON DISEASE (HCC): Primary | ICD-10-CM

## 2020-01-28 DIAGNOSIS — M20.42 HAMMER TOES OF BOTH FEET: ICD-10-CM

## 2020-01-28 PROCEDURE — 11721 DEBRIDE NAIL 6 OR MORE: CPT | Performed by: PODIATRIST

## 2020-01-28 NOTE — PROGRESS NOTES
Joselo Thompson is a 67year old male. Patient presents with:  Toenail Care: 67year old male unable to trim his own nails. Patient denies any pain or swelling.         HPI:     Is in today bothered by thick dystrophic nails and on that has no ability to t Substance and Sexual Activity      Alcohol use:  Yes        Alcohol/week: 3.0 standard drinks        Types: 2 Glasses of wine, 1 Cans of beer per week      Drug use: No          REVIEW OF SYSTEMS:   Review of Systems  Today reviewed systems as documented b

## 2020-02-28 RX ORDER — TAMSULOSIN HYDROCHLORIDE 0.4 MG/1
CAPSULE ORAL
Qty: 90 CAPSULE | Refills: 1 | Status: SHIPPED | OUTPATIENT
Start: 2020-02-28 | End: 2020-06-17 | Stop reason: SDUPTHER

## 2020-06-05 ENCOUNTER — TELEPHONE (OUTPATIENT)
Dept: SURGERY | Facility: CLINIC | Age: 80
End: 2020-06-05

## 2020-06-05 NOTE — TELEPHONE ENCOUNTER
Pt's spouse called stating pt is being referred by Dr. Tom Natarajan; spouse informed LOV notes are necessary, spouse will have notes faxed to [facility] then will await call back based on Dr. Kristel Steinberg

## 2020-06-09 ENCOUNTER — APPOINTMENT (OUTPATIENT)
Dept: CARDIOLOGY | Age: 80
End: 2020-06-09

## 2020-06-12 NOTE — TELEPHONE ENCOUNTER
Wife calling to see if Medical records from Dr. Sara Finley have been received. Informed wife as of today nothing has been received. She will call Dr. Chloé López office to have notes faxed over.

## 2020-06-17 ENCOUNTER — OFFICE VISIT (OUTPATIENT)
Dept: INTERNAL MEDICINE | Age: 80
End: 2020-06-17

## 2020-06-17 DIAGNOSIS — N13.8 BENIGN PROSTATIC HYPERPLASIA WITH URINARY OBSTRUCTION: ICD-10-CM

## 2020-06-17 DIAGNOSIS — N40.1 BENIGN PROSTATIC HYPERPLASIA WITH URINARY OBSTRUCTION: ICD-10-CM

## 2020-06-17 DIAGNOSIS — E78.49 OTHER HYPERLIPIDEMIA: Primary | ICD-10-CM

## 2020-06-17 DIAGNOSIS — I10 ESSENTIAL HYPERTENSION: ICD-10-CM

## 2020-06-17 PROCEDURE — 99443 TELEPHONE E&M BY PHYSICIAN EST PT NOT ORIG PREV 7 DAYS 21-30 MIN: CPT | Performed by: INTERNAL MEDICINE

## 2020-06-17 RX ORDER — BENAZEPRIL HYDROCHLORIDE 5 MG/1
2.5 TABLET, FILM COATED ORAL DAILY
Qty: 45 TABLET | Refills: 3 | Status: SHIPPED | OUTPATIENT
Start: 2020-06-17 | End: 2020-10-20 | Stop reason: ALTCHOICE

## 2020-06-17 RX ORDER — ATORVASTATIN CALCIUM 10 MG/1
10 TABLET, FILM COATED ORAL DAILY
Qty: 90 TABLET | Refills: 3 | Status: SHIPPED | OUTPATIENT
Start: 2020-06-17 | End: 2021-06-14

## 2020-06-17 RX ORDER — FINASTERIDE 5 MG/1
5 TABLET, FILM COATED ORAL DAILY
Qty: 90 TABLET | Refills: 3 | Status: SHIPPED | OUTPATIENT
Start: 2020-06-17 | End: 2021-06-14

## 2020-06-17 RX ORDER — TAMSULOSIN HYDROCHLORIDE 0.4 MG/1
0.4 CAPSULE ORAL EVERY EVENING
Qty: 90 CAPSULE | Refills: 3 | Status: SHIPPED | OUTPATIENT
Start: 2020-06-17 | End: 2021-06-14

## 2020-06-18 NOTE — TELEPHONE ENCOUNTER
Pt's spouse Prisca Bolanos calling again asking for appt, informed Prisca Bolanos that records just received this morning and pt/spouse will be contacted once Dr Juanjose Mazariegos determination is made;  Prisca Bolanos appreciative

## 2020-06-24 ENCOUNTER — OFFICE VISIT (OUTPATIENT)
Dept: PAIN CLINIC | Facility: CLINIC | Age: 80
End: 2020-06-24
Payer: COMMERCIAL

## 2020-06-24 VITALS — BODY MASS INDEX: 29.23 KG/M2 | HEIGHT: 63 IN | WEIGHT: 165 LBS

## 2020-06-24 DIAGNOSIS — G20 PARKINSON'S DISEASE (HCC): ICD-10-CM

## 2020-06-24 DIAGNOSIS — M51.36 DDD (DEGENERATIVE DISC DISEASE), LUMBAR: Primary | ICD-10-CM

## 2020-06-24 DIAGNOSIS — M47.816 LUMBAR FACET ARTHROPATHY: ICD-10-CM

## 2020-06-24 PROBLEM — G20.A1 PARKINSON'S DISEASE (HCC): Status: ACTIVE | Noted: 2020-06-24

## 2020-06-24 PROBLEM — G20.A1 PARKINSON'S DISEASE: Status: ACTIVE | Noted: 2020-06-24

## 2020-06-24 PROBLEM — M51.369 DDD (DEGENERATIVE DISC DISEASE), LUMBAR: Status: ACTIVE | Noted: 2020-06-24

## 2020-06-24 PROCEDURE — 99203 OFFICE O/P NEW LOW 30 MIN: CPT | Performed by: ANESTHESIOLOGY

## 2020-06-24 RX ORDER — FINASTERIDE 5 MG/1
TABLET, FILM COATED ORAL
COMMUNITY
Start: 2020-06-17

## 2020-06-24 RX ORDER — ACETAMINOPHEN 500 MG
TABLET ORAL
COMMUNITY
Start: 2020-02-24

## 2020-06-24 RX ORDER — TAMSULOSIN HYDROCHLORIDE 0.4 MG/1
CAPSULE ORAL
COMMUNITY
Start: 2020-06-17

## 2020-06-24 RX ORDER — CARBIDOPA AND LEVODOPA 25; 100 MG/1; MG/1
TABLET, EXTENDED RELEASE ORAL
COMMUNITY
Start: 2020-04-24 | End: 2020-06-24

## 2020-06-24 RX ORDER — LEVODOPA AND CARBIDOPA 95; 23.75 MG/1; MG/1
1 CAPSULE, EXTENDED RELEASE ORAL
COMMUNITY
Start: 2020-04-06

## 2020-06-24 RX ORDER — AMANTADINE 137 MG/1
CAPSULE, COATED PELLETS ORAL
COMMUNITY
Start: 2020-04-08 | End: 2020-06-24

## 2020-06-24 NOTE — PROGRESS NOTES
PHYSICAL EXAM:   Physical Exam   Constitutional:           Patient presents in office today with reported pain in lower back right side     Current pain level reported = 0/10      Location of Pain:  lower back right side    Date Pain Began: 10/2018

## 2020-06-24 NOTE — H&P
Name: Paola Orozco   : 1940   DOS: 2020     Chief complaint: Low back pain    History of present illness:  Paola Orozco is a 79year old male with a history of Parkinson's disease referred by Dr. April Healy for evaluation of chronic low back Family History   Problem Relation Age of Onset   • Colon Cancer Father    • Heart Attack Father      Social History    Tobacco Use      Smoking status: Never Smoker      Smokeless tobacco: Never Used    Alcohol use:  Yes      Alcohol/week: 3.0 standard lumbar facet arthropathy. Assessment:  Ddd (degenerative disc disease), lumbar  (primary encounter diagnosis)  Parkinson's disease (hcc)  Lumbar facet arthropathy.       Plan:     The patient is a very pleasant 81-year-old gentleman accompanied by his wi

## 2020-06-25 ENCOUNTER — TELEPHONE (OUTPATIENT)
Dept: PAIN CLINIC | Facility: CLINIC | Age: 80
End: 2020-06-25

## 2020-06-25 NOTE — TELEPHONE ENCOUNTER
Medical clearance needed- Aspirin 325 mg ? Implanted cardiac device/SCS/PNS: n/a     Pt seen in OV today by Dr Eugenio Lloyd  and recommended for Right Lumbar facet  (X 1). Please begin PA process for procedure(s).      Lumbar Facet  Laterality: Right   Level(s)

## 2020-06-25 NOTE — TELEPHONE ENCOUNTER
Prior authorization request completed for: Right Lumbar FACET   Authorization #Notification or Prior Authorization is not required for the requested services    This [employer]vant[employer] members plan does not currently require a prior author

## 2020-06-26 NOTE — TELEPHONE ENCOUNTER
Patient advised  is now performing elective procedures, patient agreeable to rescheduling. Patient confirmed active [facility] policy, no changes to insurance. Active authorization is on file.       Patient advised they will be required to have a COVID test ? Please bring your Insurance Card, Photo ID, List of Current Medications and Referral (if applicable) to your appointment. ? Please park in the [address] and follow the signs to the mgMEDIA. ? Check in at [facility] ([address]  [place] Most insurances are now requiring a preauthorization for all procedures. In the event that your insurance does not authorize your procedure within 48 hours of the scheduled date, your procedure will be cancelled and rescheduled to a later date.   Please c

## 2020-06-26 NOTE — TELEPHONE ENCOUNTER
Patient's wife expressed concern with patient remaining still for MRI procedure due to Parkinson's. Discussed with RN, can offer sedation for MRI procedure.  Wife advised of sedation option, advised will require patient to have appointment for clearance fr

## 2020-07-07 ENCOUNTER — OFFICE VISIT (OUTPATIENT)
Dept: PODIATRY CLINIC | Facility: CLINIC | Age: 80
End: 2020-07-07
Payer: COMMERCIAL

## 2020-07-07 DIAGNOSIS — M20.42 HAMMER TOES OF BOTH FEET: ICD-10-CM

## 2020-07-07 DIAGNOSIS — M21.6X9 PLANTAR FLEXED METATARSAL, UNSPECIFIED LATERALITY: Primary | ICD-10-CM

## 2020-07-07 DIAGNOSIS — M20.41 HAMMER TOES OF BOTH FEET: ICD-10-CM

## 2020-07-07 DIAGNOSIS — G20 PARKINSON DISEASE (HCC): ICD-10-CM

## 2020-07-07 DIAGNOSIS — B35.1 ONYCHOMYCOSIS: ICD-10-CM

## 2020-07-07 PROCEDURE — 99213 OFFICE O/P EST LOW 20 MIN: CPT | Performed by: PODIATRIST

## 2020-07-08 ENCOUNTER — LAB ENCOUNTER (OUTPATIENT)
Dept: LAB | Facility: HOSPITAL | Age: 80
End: 2020-07-08
Attending: ANESTHESIOLOGY
Payer: MEDICARE

## 2020-07-08 DIAGNOSIS — M47.816 LUMBAR FACET ARTHROPATHY: ICD-10-CM

## 2020-07-08 LAB — SARS-COV-2 RNA RESP QL NAA+PROBE: NOT DETECTED

## 2020-07-08 NOTE — PROGRESS NOTES
Abhishek Oconnor is a 79year old male. Patient presents with:  Toenail Care: nail and callus trimming - on and off pain - denies any pain today.         HPI:     Is today for care he has Parkinson's disease and has pain secondary to long dystrophic nails as standard drinks        Types: 2 Glasses of wine, 1 Cans of beer per week      Drug use: No    Other Topics      Concerns:        Caffeine Concern: Yes        Exercise: No          REVIEW OF SYSTEMS:   Review of Systems    Today reviewed systems as document means. All 10 toes were debrided    The patient indicates understanding of these issues and agrees to the plan. Return in about 4 months (around 11/7/2020).     Adrienne Guevara DPM  7/8/2020

## 2020-07-09 ENCOUNTER — APPOINTMENT (OUTPATIENT)
Dept: GENERAL RADIOLOGY | Facility: HOSPITAL | Age: 80
End: 2020-07-09
Attending: ANESTHESIOLOGY
Payer: MEDICARE

## 2020-07-09 ENCOUNTER — HOSPITAL ENCOUNTER (OUTPATIENT)
Facility: HOSPITAL | Age: 80
Setting detail: HOSPITAL OUTPATIENT SURGERY
Discharge: HOME | End: 2020-07-09
Attending: ANESTHESIOLOGY | Admitting: ANESTHESIOLOGY
Payer: MEDICARE

## 2020-07-09 VITALS
RESPIRATION RATE: 18 BRPM | DIASTOLIC BLOOD PRESSURE: 88 MMHG | TEMPERATURE: 97 F | OXYGEN SATURATION: 98 % | SYSTOLIC BLOOD PRESSURE: 138 MMHG | HEART RATE: 80 BPM

## 2020-07-09 DIAGNOSIS — M47.816 LUMBAR FACET ARTHROPATHY: Primary | ICD-10-CM

## 2020-07-09 DIAGNOSIS — M51.36 DDD (DEGENERATIVE DISC DISEASE), LUMBAR: ICD-10-CM

## 2020-07-09 DIAGNOSIS — G20 PARKINSON'S DISEASE (HCC): ICD-10-CM

## 2020-07-09 PROCEDURE — 3E0U3BZ INTRODUCTION OF ANESTHETIC AGENT INTO JOINTS, PERCUTANEOUS APPROACH: ICD-10-PCS | Performed by: ANESTHESIOLOGY

## 2020-07-09 PROCEDURE — 3E0U33Z INTRODUCTION OF ANTI-INFLAMMATORY INTO JOINTS, PERCUTANEOUS APPROACH: ICD-10-PCS | Performed by: ANESTHESIOLOGY

## 2020-07-09 PROCEDURE — BR161ZZ FLUOROSCOPY OF LUMBAR FACET JOINT(S) USING LOW OSMOLAR CONTRAST: ICD-10-PCS | Performed by: ANESTHESIOLOGY

## 2020-07-09 RX ORDER — METHYLPREDNISOLONE ACETATE 40 MG/ML
INJECTION, SUSPENSION INTRA-ARTICULAR; INTRALESIONAL; INTRAMUSCULAR; SOFT TISSUE AS NEEDED
Status: DISCONTINUED | OUTPATIENT
Start: 2020-07-09 | End: 2020-07-09

## 2020-07-09 RX ORDER — LIDOCAINE HYDROCHLORIDE 10 MG/ML
INJECTION, SOLUTION EPIDURAL; INFILTRATION; INTRACAUDAL; PERINEURAL AS NEEDED
Status: DISCONTINUED | OUTPATIENT
Start: 2020-07-09 | End: 2020-07-09

## 2020-07-09 RX ORDER — ONDANSETRON 2 MG/ML
4 INJECTION INTRAMUSCULAR; INTRAVENOUS ONCE AS NEEDED
Status: DISCONTINUED | OUTPATIENT
Start: 2020-07-09 | End: 2020-07-09

## 2020-07-09 RX ORDER — DIPHENHYDRAMINE HYDROCHLORIDE 50 MG/ML
50 INJECTION INTRAMUSCULAR; INTRAVENOUS ONCE AS NEEDED
Status: DISCONTINUED | OUTPATIENT
Start: 2020-07-09 | End: 2020-07-09

## 2020-07-09 RX ORDER — SODIUM CHLORIDE, SODIUM LACTATE, POTASSIUM CHLORIDE, CALCIUM CHLORIDE 600; 310; 30; 20 MG/100ML; MG/100ML; MG/100ML; MG/100ML
100 INJECTION, SOLUTION INTRAVENOUS CONTINUOUS
Status: DISCONTINUED | OUTPATIENT
Start: 2020-07-09 | End: 2020-07-09

## 2020-07-09 RX ORDER — BUPIVACAINE HYDROCHLORIDE 5 MG/ML
INJECTION, SOLUTION EPIDURAL; INTRACAUDAL AS NEEDED
Status: DISCONTINUED | OUTPATIENT
Start: 2020-07-09 | End: 2020-07-09

## 2020-07-09 NOTE — H&P
History & Physical Examination    Patient Name: Maikol Carrion  MRN: [id number]  CSN: [id number]  YOB: 1940    Pre-Operative Diagnosis:  DDD (degenerative disc disease), lumbar [M51.36]  [facility] [G20]  Lumbar facet arthropat status: Never Smoker      Smokeless tobacco: Never Used    Alcohol use:  Yes      Alcohol/week: 3.0 standard drinks      Types: 2 Glasses of wine, 1 Cans of beer per week      SYSTEM Check if Review is Normal Check if Physical Exam is Normal If not normal,

## 2020-07-09 NOTE — OPERATIVE REPORT
[facility]  Operative Report  2020     Mohit Cespedes Patient Status:  Hospital Outpatient Surgery    1940 MRN [id number]   [facility] ENDOSCOPY Attending Duarte Coello MD   Hosp Day # 0 [facility] [address]     Indication: Da procedure very well. The patient was observed until discharge criteria met. Discharge instructions were given and patient was released to a responsible adult. Complications: None. Follow up:  The patient was followed in the pain clinic as needed ba

## 2020-07-27 ENCOUNTER — OFFICE VISIT (OUTPATIENT)
Dept: PAIN CLINIC | Facility: CLINIC | Age: 80
End: 2020-07-27
Payer: COMMERCIAL

## 2020-07-27 VITALS — WEIGHT: 165 LBS | BODY MASS INDEX: 29.23 KG/M2 | HEIGHT: 63 IN

## 2020-07-27 DIAGNOSIS — M47.816 LUMBAR FACET ARTHROPATHY: Primary | ICD-10-CM

## 2020-07-27 DIAGNOSIS — M51.36 DDD (DEGENERATIVE DISC DISEASE), LUMBAR: ICD-10-CM

## 2020-07-27 PROCEDURE — 3008F BODY MASS INDEX DOCD: CPT | Performed by: ANESTHESIOLOGY

## 2020-07-27 PROCEDURE — 99214 OFFICE O/P EST MOD 30 MIN: CPT | Performed by: ANESTHESIOLOGY

## 2020-07-27 NOTE — PROGRESS NOTES
Name: Hernesto Garibay   : 1940   DOS: 2020     Pain Clinic Follow Up Visit:   Patient presents with:  Procedure Follow Up      Hernesto Garibay is a 79year old male with Parkinson's and right-sided low back pain, following up after lumbar face motion  Back: Facet loading positive      IMAGES:   No new imaging      ASSESSMENT AND PLAN:   Lumbar facet arthropathy  (primary encounter diagnosis)  DDD (degenerative disc disease), lumbar    The patient is a 21-year-old gentleman with a history of Park

## 2020-07-27 NOTE — PROGRESS NOTES
Last procedure: RIGHT LUMBAR FACET INJECTIONS X4 LEVELS WITH LOCAL   Date: 7/9/2020  Percentage of relief obtained: 70%  Duration of relief: two weeks    Patient presents in office today with reported pain in right hip, weakness in the right leg  Current p

## 2020-10-02 ENCOUNTER — TELEPHONE (OUTPATIENT)
Dept: INTERNAL MEDICINE | Age: 80
End: 2020-10-02

## 2020-10-02 DIAGNOSIS — R53.83 OTHER FATIGUE: ICD-10-CM

## 2020-10-02 DIAGNOSIS — M62.81 GENERALIZED MUSCLE WEAKNESS: Primary | ICD-10-CM

## 2020-10-02 DIAGNOSIS — E55.9 VITAMIN D INSUFFICIENCY: ICD-10-CM

## 2020-10-07 ENCOUNTER — LAB SERVICES (OUTPATIENT)
Dept: LAB | Age: 80
End: 2020-10-07

## 2020-10-07 DIAGNOSIS — I10 ESSENTIAL HYPERTENSION: ICD-10-CM

## 2020-10-07 DIAGNOSIS — R53.83 OTHER FATIGUE: ICD-10-CM

## 2020-10-07 DIAGNOSIS — E55.9 VITAMIN D INSUFFICIENCY: ICD-10-CM

## 2020-10-07 DIAGNOSIS — E78.49 OTHER HYPERLIPIDEMIA: ICD-10-CM

## 2020-10-07 LAB
25(OH)D3 SERPL-MCNC: 42.9 NG/ML (ref 30–100)
ALBUMIN SERPL-MCNC: 4.5 G/DL (ref 3.6–5.1)
ALP SERPL-CCNC: 78 U/L (ref 45–115)
ALT SERPL W/O P-5'-P-CCNC: 7 U/L (ref 5–49)
AST SERPL-CCNC: 30 U/L (ref 14–43)
BASOPHIL %: 0.8 % (ref 0–1.2)
BASOPHIL ABSOLUTE #: 0.1 10*3/UL (ref 0–0.1)
BILIRUB CONJ SERPL-MCNC: 0 MG/DL (ref 0–0.3)
BILIRUB SERPL-MCNC: 1.1 MG/DL (ref 0–1.3)
BUN SERPL-MCNC: 24 MG/DL (ref 6–27)
CALCIUM SERPL-MCNC: 9.7 MG/DL (ref 8.6–10.6)
CHLORIDE SERPL-SCNC: 103 MMOL/L (ref 96–107)
CHOLEST SERPL-MCNC: 131 MG/DL (ref 140–200)
CO2 SERPL-SCNC: 26 MMOL/L (ref 22–32)
CREAT SERPL-MCNC: 1.6 MG/DL (ref 0.6–1.6)
DIFFERENTIAL TYPE: ABNORMAL
EOSINOPHIL %: 3.5 % (ref 0–10)
EOSINOPHIL ABSOLUTE #: 0.3 10*3/UL (ref 0–0.5)
GFR SERPL CREATININE-BSD FRML MDRD: 42 ML/MIN/{1.73M2}
GFR SERPL CREATININE-BSD FRML MDRD: 51 ML/MIN/{1.73M2}
GLUCOSE P FAST SERPL-MCNC: 90 MG/DL (ref 60–100)
HDLC SERPL-MCNC: 30 MG/DL
HEMATOCRIT: 46.4 % (ref 40–51)
HEMOGLOBIN: 15.2 G/DL (ref 13.7–17.5)
IMMATURE GRANULOCYTE ABSOLUTE: 0.03 10*3/UL (ref 0–0.05)
IMMATURE GRANULOCYTE PERCENT: 0.3 % (ref 0–0.5)
LDLC SERPL CALC-MCNC: 64 MG/DL (ref 30–100)
LYMPH PERCENT: 34.6 % (ref 20.5–51.1)
LYMPHOCYTE ABSOLUTE #: 3.3 10*3/UL (ref 1.2–3.4)
MEAN CORPUSCULAR HGB CONCENTRATION: 32.8 % (ref 32–36)
MEAN CORPUSCULAR HGB: 31.4 PG (ref 27–34)
MEAN CORPUSCULAR VOLUME: 95.9 FL (ref 79–95)
MEAN PLATELET VOLUME: 9.9 FL (ref 8.6–12.4)
MONOCYTE ABSOLUTE #: 0.8 10*3/UL (ref 0.2–0.9)
MONOCYTE PERCENT: 8.7 % (ref 4.3–12.9)
NEUTROPHIL ABSOLUTE #: 5 10*3/UL (ref 1.4–6.5)
NEUTROPHIL PERCENT: 52.1 % (ref 34–73.5)
PLATELET COUNT: 230 10*3/UL (ref 150–400)
POTASSIUM SERPL-SCNC: 4.2 MMOL/L (ref 3.5–5.3)
PROT SERPL-MCNC: 7.3 G/DL (ref 6.4–8.5)
RED BLOOD CELL COUNT: 4.84 10*6/UL (ref 3.9–5.7)
RED CELL DISTRIBUTION WIDTH: 12.9 % (ref 11.3–14.8)
SODIUM SERPL-SCNC: 139 MMOL/L (ref 136–146)
TRIGL SERPL-MCNC: 184 MG/DL (ref 0–200)
TSH SERPL DL<=0.05 MIU/L-ACNC: 1.37 M[IU]/L (ref 0.3–4.82)
WHITE BLOOD CELL COUNT: 9.5 10*3/UL (ref 4–10)

## 2020-10-07 PROCEDURE — 80048 BASIC METABOLIC PNL TOTAL CA: CPT | Performed by: INTERNAL MEDICINE

## 2020-10-07 PROCEDURE — 36415 COLL VENOUS BLD VENIPUNCTURE: CPT | Performed by: INTERNAL MEDICINE

## 2020-10-07 PROCEDURE — 85025 COMPLETE CBC W/AUTO DIFF WBC: CPT | Performed by: INTERNAL MEDICINE

## 2020-10-07 PROCEDURE — 82306 VITAMIN D 25 HYDROXY: CPT | Performed by: INTERNAL MEDICINE

## 2020-10-07 PROCEDURE — 84443 ASSAY THYROID STIM HORMONE: CPT | Performed by: INTERNAL MEDICINE

## 2020-10-07 PROCEDURE — 80061 LIPID PANEL: CPT | Performed by: INTERNAL MEDICINE

## 2020-10-07 PROCEDURE — 80076 HEPATIC FUNCTION PANEL: CPT | Performed by: INTERNAL MEDICINE

## 2020-10-10 ENCOUNTER — E-ADVICE (OUTPATIENT)
Dept: INTERNAL MEDICINE | Age: 80
End: 2020-10-10

## 2020-10-16 ASSESSMENT — PATIENT HEALTH QUESTIONNAIRE - PHQ9
CLINICAL INTERPRETATION OF PHQ2 SCORE: NO FURTHER SCREENING NEEDED
CLINICAL INTERPRETATION OF PHQ2 SCORE: 0
1. LITTLE INTEREST OR PLEASURE IN DOING THINGS: NOT AT ALL
2. FEELING DOWN, DEPRESSED OR HOPELESS: NOT AT ALL

## 2020-10-20 ENCOUNTER — OFFICE VISIT (OUTPATIENT)
Dept: INTERNAL MEDICINE | Age: 80
End: 2020-10-20

## 2020-10-20 VITALS
WEIGHT: 164 LBS | SYSTOLIC BLOOD PRESSURE: 110 MMHG | HEART RATE: 72 BPM | TEMPERATURE: 97.1 F | HEIGHT: 64 IN | BODY MASS INDEX: 28 KG/M2 | DIASTOLIC BLOOD PRESSURE: 72 MMHG | RESPIRATION RATE: 20 BRPM

## 2020-10-20 DIAGNOSIS — I10 ESSENTIAL HYPERTENSION: ICD-10-CM

## 2020-10-20 DIAGNOSIS — N18.30 STAGE 3 CHRONIC KIDNEY DISEASE, UNSPECIFIED WHETHER STAGE 3A OR 3B CKD (CMD): ICD-10-CM

## 2020-10-20 DIAGNOSIS — R26.9 ABNORMAL GAIT: ICD-10-CM

## 2020-10-20 DIAGNOSIS — N40.1 BENIGN PROSTATIC HYPERPLASIA WITH URINARY OBSTRUCTION: ICD-10-CM

## 2020-10-20 DIAGNOSIS — G20.A1 PARKINSON'S DISEASE: Primary | ICD-10-CM

## 2020-10-20 DIAGNOSIS — N13.8 BENIGN PROSTATIC HYPERPLASIA WITH URINARY OBSTRUCTION: ICD-10-CM

## 2020-10-20 PROCEDURE — 99215 OFFICE O/P EST HI 40 MIN: CPT | Performed by: INTERNAL MEDICINE

## 2020-10-20 RX ORDER — ACETAMINOPHEN 500 MG
500 TABLET ORAL EVERY 6 HOURS PRN
COMMUNITY

## 2020-10-20 ASSESSMENT — PATIENT HEALTH QUESTIONNAIRE - PHQ9
CLINICAL INTERPRETATION OF PHQ9 SCORE: NO FURTHER SCREENING NEEDED
SUM OF ALL RESPONSES TO PHQ9 QUESTIONS 1 AND 2: 0

## 2020-10-26 ENCOUNTER — TELEPHONE (OUTPATIENT)
Dept: INTERNAL MEDICINE | Age: 80
End: 2020-10-26

## 2020-10-26 PROCEDURE — G0180 MD CERTIFICATION HHA PATIENT: HCPCS | Performed by: INTERNAL MEDICINE

## 2020-10-27 RX ORDER — ASPIRIN 325 MG
325 TABLET, DELAYED RELEASE (ENTERIC COATED) ORAL DAILY
Qty: 30 TABLET | Refills: 0 | Status: SHIPPED | COMMUNITY
Start: 2020-10-27 | End: 2022-04-07 | Stop reason: CLARIF

## 2020-11-05 ENCOUNTER — TELEPHONE (OUTPATIENT)
Dept: INTERNAL MEDICINE | Age: 80
End: 2020-11-05

## 2020-11-05 RX ORDER — BENAZEPRIL HYDROCHLORIDE 5 MG/1
2.5 TABLET, FILM COATED ORAL DAILY
Qty: 45 TABLET | Refills: 1 | Status: SHIPPED | COMMUNITY
Start: 2020-11-05 | End: 2021-04-26

## 2020-12-08 ENCOUNTER — TELEPHONE (OUTPATIENT)
Dept: INTERNAL MEDICINE | Age: 80
End: 2020-12-08

## 2021-02-19 ENCOUNTER — TELEPHONE (OUTPATIENT)
Dept: DERMATOLOGY | Age: 81
End: 2021-02-19

## 2021-02-22 ENCOUNTER — OFFICE VISIT (OUTPATIENT)
Dept: DERMATOLOGY | Age: 81
End: 2021-02-22

## 2021-02-22 DIAGNOSIS — L30.9 DERMATITIS: ICD-10-CM

## 2021-02-22 DIAGNOSIS — L57.0 AK (ACTINIC KERATOSIS): Primary | ICD-10-CM

## 2021-02-22 DIAGNOSIS — L85.3 XEROSIS CUTIS: ICD-10-CM

## 2021-02-22 PROCEDURE — 99214 OFFICE O/P EST MOD 30 MIN: CPT | Performed by: PHYSICIAN ASSISTANT

## 2021-02-22 PROCEDURE — 17000 DESTRUCT PREMALG LESION: CPT | Performed by: PHYSICIAN ASSISTANT

## 2021-03-03 ENCOUNTER — APPOINTMENT (OUTPATIENT)
Dept: DERMATOLOGY | Age: 81
End: 2021-03-03

## 2021-03-10 DIAGNOSIS — Z23 NEED FOR VACCINATION: ICD-10-CM

## 2021-04-12 ENCOUNTER — LAB SERVICES (OUTPATIENT)
Dept: LAB | Age: 81
End: 2021-04-12

## 2021-04-12 ENCOUNTER — TELEPHONE (OUTPATIENT)
Dept: INTERNAL MEDICINE | Age: 81
End: 2021-04-12

## 2021-04-12 DIAGNOSIS — E78.5 HYPERLIPIDEMIA, UNSPECIFIED HYPERLIPIDEMIA TYPE: ICD-10-CM

## 2021-04-12 DIAGNOSIS — I10 ESSENTIAL HYPERTENSION: Primary | ICD-10-CM

## 2021-04-12 LAB
ALBUMIN SERPL-MCNC: 4.8 G/DL (ref 3.6–5.1)
ALP SERPL-CCNC: 79 U/L (ref 45–115)
ALT SERPL W/O P-5'-P-CCNC: 10 U/L (ref 5–49)
AST SERPL-CCNC: 31 U/L (ref 14–43)
BASOPHIL %: 1.1 % (ref 0–1.2)
BASOPHIL ABSOLUTE #: 0.1 10*3/UL (ref 0–0.1)
BILIRUB SERPL-MCNC: 0.6 MG/DL (ref 0–1.3)
BUN SERPL-MCNC: 21 MG/DL (ref 6–27)
CALCIUM SERPL-MCNC: 10.1 MG/DL (ref 8.6–10.6)
CHLORIDE SERPL-SCNC: 101 MMOL/L (ref 96–107)
CHOLEST SERPL-MCNC: 144 MG/DL (ref 140–200)
CO2 SERPL-SCNC: 28 MMOL/L (ref 22–32)
CREAT SERPL-MCNC: 1.7 MG/DL (ref 0.6–1.6)
DIFFERENTIAL TYPE: ABNORMAL
EOSINOPHIL %: 3 % (ref 0–10)
EOSINOPHIL ABSOLUTE #: 0.3 10*3/UL (ref 0–0.5)
GFR SERPL CREATININE-BSD FRML MDRD: 39 ML/MIN/{1.73M2}
GFR SERPL CREATININE-BSD FRML MDRD: 47 ML/MIN/{1.73M2}
GLUCOSE SERPL-MCNC: 86 MG/DL (ref 70–200)
HDLC SERPL-MCNC: 30 MG/DL
HEMATOCRIT: 48.7 % (ref 40–51)
HEMOGLOBIN: 16 G/DL (ref 13.7–17.5)
IMMATURE GRANULOCYTE ABSOLUTE: 0.03 10*3/UL (ref 0–0.05)
IMMATURE GRANULOCYTE PERCENT: 0.3 % (ref 0–0.5)
LDLC SERPL CALC-MCNC: 74 MG/DL (ref 30–100)
LYMPH PERCENT: 32.1 % (ref 20.5–51.1)
LYMPHOCYTE ABSOLUTE #: 3.1 10*3/UL (ref 1.2–3.4)
MEAN CORPUSCULAR HGB CONCENTRATION: 32.9 % (ref 32–36)
MEAN CORPUSCULAR HGB: 31.5 PG (ref 27–34)
MEAN CORPUSCULAR VOLUME: 95.9 FL (ref 79–95)
MEAN PLATELET VOLUME: 10 FL (ref 8.6–12.4)
MONOCYTE ABSOLUTE #: 1 10*3/UL (ref 0.2–0.9)
MONOCYTE PERCENT: 10 % (ref 4.3–12.9)
NEUTROPHIL ABSOLUTE #: 5.2 10*3/UL (ref 1.4–6.5)
NEUTROPHIL PERCENT: 53.5 % (ref 34–73.5)
PLATELET COUNT: 220 10*3/UL (ref 150–400)
POTASSIUM SERPL-SCNC: 4.9 MMOL/L (ref 3.5–5.3)
PROT SERPL-MCNC: 7.5 G/DL (ref 6.4–8.5)
RED BLOOD CELL COUNT: 5.08 10*6/UL (ref 3.9–5.7)
RED CELL DISTRIBUTION WIDTH: 13 % (ref 11.3–14.8)
SODIUM SERPL-SCNC: 142 MMOL/L (ref 136–146)
TRIGL SERPL-MCNC: 200 MG/DL (ref 0–200)
TSH SERPL DL<=0.05 MIU/L-ACNC: 1.68 M[IU]/L (ref 0.3–4.82)
WHITE BLOOD CELL COUNT: 9.7 10*3/UL (ref 4–10)

## 2021-04-12 PROCEDURE — 84443 ASSAY THYROID STIM HORMONE: CPT | Performed by: INTERNAL MEDICINE

## 2021-04-12 PROCEDURE — 36415 COLL VENOUS BLD VENIPUNCTURE: CPT | Performed by: INTERNAL MEDICINE

## 2021-04-12 PROCEDURE — 85025 COMPLETE CBC W/AUTO DIFF WBC: CPT | Performed by: INTERNAL MEDICINE

## 2021-04-12 PROCEDURE — 80053 COMPREHEN METABOLIC PANEL: CPT | Performed by: INTERNAL MEDICINE

## 2021-04-12 PROCEDURE — 80061 LIPID PANEL: CPT | Performed by: INTERNAL MEDICINE

## 2021-04-20 ENCOUNTER — OFFICE VISIT (OUTPATIENT)
Dept: INTERNAL MEDICINE | Age: 81
End: 2021-04-20

## 2021-04-20 VITALS
BODY MASS INDEX: 28.85 KG/M2 | HEIGHT: 64 IN | DIASTOLIC BLOOD PRESSURE: 76 MMHG | TEMPERATURE: 97.1 F | HEART RATE: 88 BPM | OXYGEN SATURATION: 96 % | WEIGHT: 169 LBS | SYSTOLIC BLOOD PRESSURE: 128 MMHG

## 2021-04-20 DIAGNOSIS — R26.9 ABNORMAL GAIT: ICD-10-CM

## 2021-04-20 DIAGNOSIS — I10 ESSENTIAL HYPERTENSION: ICD-10-CM

## 2021-04-20 DIAGNOSIS — E78.49 OTHER HYPERLIPIDEMIA: Primary | ICD-10-CM

## 2021-04-20 PROCEDURE — 99214 OFFICE O/P EST MOD 30 MIN: CPT | Performed by: INTERNAL MEDICINE

## 2021-04-20 PROCEDURE — 3074F SYST BP LT 130 MM HG: CPT | Performed by: INTERNAL MEDICINE

## 2021-04-20 PROCEDURE — 3078F DIAST BP <80 MM HG: CPT | Performed by: INTERNAL MEDICINE

## 2021-04-20 PROCEDURE — G0439 PPPS, SUBSEQ VISIT: HCPCS | Performed by: INTERNAL MEDICINE

## 2021-04-20 ASSESSMENT — PATIENT HEALTH QUESTIONNAIRE - PHQ9
CLINICAL INTERPRETATION OF PHQ2 SCORE: NO FURTHER SCREENING NEEDED
2. FEELING DOWN, DEPRESSED OR HOPELESS: SEVERAL DAYS
CLINICAL INTERPRETATION OF PHQ9 SCORE: NO FURTHER SCREENING NEEDED
SUM OF ALL RESPONSES TO PHQ9 QUESTIONS 1 AND 2: 2
1. LITTLE INTEREST OR PLEASURE IN DOING THINGS: SEVERAL DAYS
SUM OF ALL RESPONSES TO PHQ9 QUESTIONS 1 AND 2: 2

## 2021-04-26 ENCOUNTER — TELEPHONE (OUTPATIENT)
Dept: INTERNAL MEDICINE | Age: 81
End: 2021-04-26

## 2021-05-13 ENCOUNTER — OFFICE VISIT (OUTPATIENT)
Dept: PODIATRY CLINIC | Facility: CLINIC | Age: 81
End: 2021-05-13
Payer: COMMERCIAL

## 2021-05-13 VITALS — WEIGHT: 165 LBS | BODY MASS INDEX: 29 KG/M2

## 2021-05-13 DIAGNOSIS — M21.6X9 PLANTAR FLEXED METATARSAL, UNSPECIFIED LATERALITY: Primary | ICD-10-CM

## 2021-05-13 DIAGNOSIS — B35.1 ONYCHOMYCOSIS: ICD-10-CM

## 2021-05-13 DIAGNOSIS — M77.42 METATARSALGIA OF BOTH FEET: ICD-10-CM

## 2021-05-13 DIAGNOSIS — M20.42 HAMMER TOES OF BOTH FEET: ICD-10-CM

## 2021-05-13 DIAGNOSIS — L84 CALLUS OF FOOT: ICD-10-CM

## 2021-05-13 DIAGNOSIS — M20.41 HAMMER TOES OF BOTH FEET: ICD-10-CM

## 2021-05-13 DIAGNOSIS — M77.41 METATARSALGIA OF BOTH FEET: ICD-10-CM

## 2021-05-13 DIAGNOSIS — G20 PARKINSON DISEASE (HCC): ICD-10-CM

## 2021-05-13 DIAGNOSIS — M79.674 PAIN IN TOES OF BOTH FEET: ICD-10-CM

## 2021-05-13 DIAGNOSIS — G20 PARKINSON'S DISEASE (HCC): ICD-10-CM

## 2021-05-13 DIAGNOSIS — M79.675 PAIN IN TOES OF BOTH FEET: ICD-10-CM

## 2021-05-13 PROCEDURE — 11721 DEBRIDE NAIL 6 OR MORE: CPT | Performed by: PODIATRIST

## 2021-05-16 NOTE — PROGRESS NOTES
Dheeraj Sebastian is a [de-identified]year old male.  Patient presents with:  Toenail Care: routine ,care         HPI:   This pleasant gentleman known history of Parkinson's disease and inability to trim his nails presents complaining of long thick dystrophic toenails so Smokeless tobacco: Never Used    Vaping Use      Vaping Use: Never used    Substance and Sexual Activity      Alcohol use:  Yes        Alcohol/week: 3.0 standard drinks        Types: 2 Glasses of wine, 1 Cans of beer per week      Drug use: No    Other Topi medial and lateral nail borders of the hallux which using a slant back technique were removed and they would not get ingrown. This totaled 10 toenails    The patient indicates understanding of these issues and agrees to the plan.     Lisa Johnson DPM

## 2021-06-03 ENCOUNTER — TELEPHONE (OUTPATIENT)
Dept: INTERNAL MEDICINE | Age: 81
End: 2021-06-03

## 2021-06-03 DIAGNOSIS — R26.9 ABNORMAL GAIT: ICD-10-CM

## 2021-06-03 DIAGNOSIS — G20.A1 PARKINSON'S DISEASE: Primary | ICD-10-CM

## 2021-06-14 RX ORDER — FINASTERIDE 5 MG/1
5 TABLET, FILM COATED ORAL DAILY
Qty: 90 TABLET | Refills: 3 | Status: SHIPPED | OUTPATIENT
Start: 2021-06-14 | End: 2022-06-07

## 2021-06-14 RX ORDER — TAMSULOSIN HYDROCHLORIDE 0.4 MG/1
0.4 CAPSULE ORAL EVERY EVENING
Qty: 90 CAPSULE | Refills: 3 | Status: SHIPPED | OUTPATIENT
Start: 2021-06-14 | End: 2022-06-07

## 2021-06-14 RX ORDER — ATORVASTATIN CALCIUM 10 MG/1
10 TABLET, FILM COATED ORAL DAILY
Qty: 90 TABLET | Refills: 3 | Status: SHIPPED | OUTPATIENT
Start: 2021-06-14 | End: 2022-06-07

## 2021-07-19 ENCOUNTER — OFFICE VISIT (OUTPATIENT)
Dept: PODIATRY CLINIC | Facility: CLINIC | Age: 81
End: 2021-07-19
Payer: COMMERCIAL

## 2021-07-19 DIAGNOSIS — M20.42 HAMMER TOES OF BOTH FEET: ICD-10-CM

## 2021-07-19 DIAGNOSIS — M20.41 HAMMER TOES OF BOTH FEET: ICD-10-CM

## 2021-07-19 DIAGNOSIS — B35.1 ONYCHOMYCOSIS: ICD-10-CM

## 2021-07-19 DIAGNOSIS — M77.41 METATARSALGIA OF BOTH FEET: ICD-10-CM

## 2021-07-19 DIAGNOSIS — M79.674 PAIN IN TOES OF BOTH FEET: ICD-10-CM

## 2021-07-19 DIAGNOSIS — M21.6X9 PLANTAR FLEXED METATARSAL, UNSPECIFIED LATERALITY: Primary | ICD-10-CM

## 2021-07-19 DIAGNOSIS — L84 CALLUS OF FOOT: ICD-10-CM

## 2021-07-19 DIAGNOSIS — M79.675 PAIN IN TOES OF BOTH FEET: ICD-10-CM

## 2021-07-19 DIAGNOSIS — G20 PARKINSON DISEASE (HCC): ICD-10-CM

## 2021-07-19 DIAGNOSIS — M77.42 METATARSALGIA OF BOTH FEET: ICD-10-CM

## 2021-07-19 PROCEDURE — 11721 DEBRIDE NAIL 6 OR MORE: CPT | Performed by: PODIATRIST

## 2021-09-20 ENCOUNTER — OFFICE VISIT (OUTPATIENT)
Dept: PODIATRY CLINIC | Facility: CLINIC | Age: 81
End: 2021-09-20
Payer: COMMERCIAL

## 2021-09-20 DIAGNOSIS — G20 PARKINSON DISEASE (HCC): ICD-10-CM

## 2021-09-20 DIAGNOSIS — M79.674 PAIN IN TOES OF BOTH FEET: ICD-10-CM

## 2021-09-20 DIAGNOSIS — M79.675 PAIN IN TOES OF BOTH FEET: ICD-10-CM

## 2021-09-20 DIAGNOSIS — M77.41 METATARSALGIA OF BOTH FEET: ICD-10-CM

## 2021-09-20 DIAGNOSIS — M20.41 HAMMER TOES OF BOTH FEET: ICD-10-CM

## 2021-09-20 DIAGNOSIS — L84 CALLUS OF FOOT: ICD-10-CM

## 2021-09-20 DIAGNOSIS — M20.42 HAMMER TOES OF BOTH FEET: ICD-10-CM

## 2021-09-20 DIAGNOSIS — L60.0 INGROWING NAIL, RIGHT GREAT TOE: ICD-10-CM

## 2021-09-20 DIAGNOSIS — M21.6X9 PLANTAR FLEXED METATARSAL, UNSPECIFIED LATERALITY: Primary | ICD-10-CM

## 2021-09-20 DIAGNOSIS — M77.42 METATARSALGIA OF BOTH FEET: ICD-10-CM

## 2021-09-20 DIAGNOSIS — B35.1 ONYCHOMYCOSIS: ICD-10-CM

## 2021-09-20 PROCEDURE — 11721 DEBRIDE NAIL 6 OR MORE: CPT | Performed by: PODIATRIST

## 2021-09-20 NOTE — PROGRESS NOTES
Pamela Rangel is a [de-identified]year old male. Patient presents with:  Toenail Care: routine nail care and callus care.         HPI:   This pleasant gentleman known history of Parkinson's disease and inability to trim his nails presents complaining of long thick dy of beer per week      Drug use: No    Other Topics      Concerns:        Caffeine Concern: No        Exercise: No          REVIEW OF SYSTEMS:   Today reviewed systens as documented below  GENERAL HEALTH: feels well otherwise  SKIN: Refer to exam below  RES issues and agrees to the plan.     Rachel Paiz DPM

## 2021-10-07 ENCOUNTER — LAB SERVICES (OUTPATIENT)
Dept: LAB | Age: 81
End: 2021-10-07

## 2021-10-07 DIAGNOSIS — I10 ESSENTIAL HYPERTENSION: ICD-10-CM

## 2021-10-07 DIAGNOSIS — E78.49 OTHER HYPERLIPIDEMIA: ICD-10-CM

## 2021-10-07 LAB
BASOPHIL %: 0.7 % (ref 0–1.2)
BASOPHIL ABSOLUTE #: 0.1 10*3/UL (ref 0–0.1)
DIFFERENTIAL TYPE: NORMAL
EOSINOPHIL %: 2.2 % (ref 0–10)
EOSINOPHIL ABSOLUTE #: 0.2 10*3/UL (ref 0–0.5)
HEMATOCRIT: 47.4 % (ref 40–51)
HEMOGLOBIN: 15.9 G/DL (ref 13.7–17.5)
IMMATURE GRANULOCYTE ABSOLUTE: 0.03 10*3/UL (ref 0–0.05)
IMMATURE GRANULOCYTE PERCENT: 0.3 % (ref 0–0.5)
LYMPH PERCENT: 27.8 % (ref 20.5–51.1)
LYMPHOCYTE ABSOLUTE #: 2.5 10*3/UL (ref 1.2–3.4)
MEAN CORPUSCULAR HGB CONCENTRATION: 33.5 % (ref 32–36)
MEAN CORPUSCULAR HGB: 31.5 PG (ref 27–34)
MEAN CORPUSCULAR VOLUME: 93.9 FL (ref 79–95)
MEAN PLATELET VOLUME: 10.2 FL (ref 8.6–12.4)
MONOCYTE ABSOLUTE #: 0.8 10*3/UL (ref 0.2–0.9)
MONOCYTE PERCENT: 9.2 % (ref 4.3–12.9)
NEUTROPHIL ABSOLUTE #: 5.4 10*3/UL (ref 1.4–6.5)
NEUTROPHIL PERCENT: 59.8 % (ref 34–73.5)
PLATELET COUNT: 214 10*3/UL (ref 150–400)
RED BLOOD CELL COUNT: 5.05 10*6/UL (ref 3.9–5.7)
RED CELL DISTRIBUTION WIDTH: 13.3 % (ref 11.3–14.8)
WHITE BLOOD CELL COUNT: 9.1 10*3/UL (ref 4–10)

## 2021-10-07 PROCEDURE — 36415 COLL VENOUS BLD VENIPUNCTURE: CPT | Performed by: INTERNAL MEDICINE

## 2021-10-07 PROCEDURE — 85025 COMPLETE CBC W/AUTO DIFF WBC: CPT | Performed by: INTERNAL MEDICINE

## 2021-10-08 DIAGNOSIS — Z01.89 ROUTINE LAB DRAW: Primary | ICD-10-CM

## 2021-10-14 ENCOUNTER — LAB SERVICES (OUTPATIENT)
Dept: LAB | Age: 81
End: 2021-10-14

## 2021-10-14 DIAGNOSIS — Z01.89 ROUTINE LAB DRAW: ICD-10-CM

## 2021-10-14 LAB
ALBUMIN SERPL-MCNC: 4.6 G/DL (ref 3.6–5.1)
ALP SERPL-CCNC: 95 U/L (ref 45–115)
ALT SERPL W/O P-5'-P-CCNC: 8 U/L (ref 5–49)
AST SERPL-CCNC: 30 U/L (ref 14–43)
BILIRUB CONJ SERPL-MCNC: 0 MG/DL (ref 0–0.3)
BILIRUB SERPL-MCNC: 0.9 MG/DL (ref 0–1.3)
BUN SERPL-MCNC: 21 MG/DL (ref 6–27)
CALCIUM SERPL-MCNC: 10.2 MG/DL (ref 8.6–10.6)
CHLORIDE SERPL-SCNC: 103 MMOL/L (ref 96–107)
CHOLEST SERPL-MCNC: 120 MG/DL (ref 140–200)
CO2 SERPL-SCNC: 27 MMOL/L (ref 22–32)
CREAT SERPL-MCNC: 1.6 MG/DL (ref 0.6–1.6)
GFR SERPL CREATININE-BSD FRML MDRD: 42 ML/MIN/{1.73M2}
GFR SERPL CREATININE-BSD FRML MDRD: 51 ML/MIN/{1.73M2}
GLUCOSE P FAST SERPL-MCNC: 97 MG/DL (ref 60–100)
HDLC SERPL-MCNC: 34 MG/DL
LDLC SERPL CALC-MCNC: 63 MG/DL (ref 30–100)
POTASSIUM SERPL-SCNC: 4.2 MMOL/L (ref 3.5–5.3)
PROT SERPL-MCNC: 6.9 G/DL (ref 6.4–8.5)
SODIUM SERPL-SCNC: 143 MMOL/L (ref 136–146)
TRIGL SERPL-MCNC: 114 MG/DL (ref 0–200)
TSH SERPL DL<=0.05 MIU/L-ACNC: 1.32 M[IU]/L (ref 0.3–4.82)

## 2021-10-14 PROCEDURE — 80076 HEPATIC FUNCTION PANEL: CPT | Performed by: INTERNAL MEDICINE

## 2021-10-14 PROCEDURE — 84443 ASSAY THYROID STIM HORMONE: CPT | Performed by: INTERNAL MEDICINE

## 2021-10-14 PROCEDURE — 80048 BASIC METABOLIC PNL TOTAL CA: CPT | Performed by: INTERNAL MEDICINE

## 2021-10-14 PROCEDURE — 80061 LIPID PANEL: CPT | Performed by: INTERNAL MEDICINE

## 2021-10-14 PROCEDURE — 36415 COLL VENOUS BLD VENIPUNCTURE: CPT | Performed by: INTERNAL MEDICINE

## 2021-10-20 ENCOUNTER — OFFICE VISIT (OUTPATIENT)
Dept: INTERNAL MEDICINE | Age: 81
End: 2021-10-20

## 2021-10-20 VITALS
HEART RATE: 78 BPM | HEIGHT: 64 IN | BODY MASS INDEX: 28.17 KG/M2 | RESPIRATION RATE: 18 BRPM | DIASTOLIC BLOOD PRESSURE: 86 MMHG | WEIGHT: 165 LBS | TEMPERATURE: 96.7 F | SYSTOLIC BLOOD PRESSURE: 120 MMHG

## 2021-10-20 DIAGNOSIS — M89.9 DISORDER OF BONE AND CARTILAGE: ICD-10-CM

## 2021-10-20 DIAGNOSIS — E78.49 OTHER HYPERLIPIDEMIA: ICD-10-CM

## 2021-10-20 DIAGNOSIS — E78.5 HYPERLIPIDEMIA, UNSPECIFIED HYPERLIPIDEMIA TYPE: ICD-10-CM

## 2021-10-20 DIAGNOSIS — M94.9 DISORDER OF BONE AND CARTILAGE: ICD-10-CM

## 2021-10-20 DIAGNOSIS — I51.89 DIASTOLIC DYSFUNCTION: Primary | ICD-10-CM

## 2021-10-20 DIAGNOSIS — N18.30 STAGE 3 CHRONIC KIDNEY DISEASE, UNSPECIFIED WHETHER STAGE 3A OR 3B CKD (CMD): ICD-10-CM

## 2021-10-20 PROCEDURE — 3074F SYST BP LT 130 MM HG: CPT | Performed by: INTERNAL MEDICINE

## 2021-10-20 PROCEDURE — 3079F DIAST BP 80-89 MM HG: CPT | Performed by: INTERNAL MEDICINE

## 2021-10-20 PROCEDURE — 99214 OFFICE O/P EST MOD 30 MIN: CPT | Performed by: INTERNAL MEDICINE

## 2021-10-20 RX ORDER — AMOXICILLIN 500 MG/1
CAPSULE ORAL
COMMUNITY
Start: 2021-10-18 | End: 2022-09-06 | Stop reason: ALTCHOICE

## 2021-10-20 RX ORDER — RISEDRONATE SODIUM 35 MG/1
35 TABLET, FILM COATED ORAL
Qty: 4 TABLET | Refills: 11 | Status: SHIPPED | OUTPATIENT
Start: 2021-10-20 | End: 2022-11-16

## 2021-10-20 ASSESSMENT — PATIENT HEALTH QUESTIONNAIRE - PHQ9
CLINICAL INTERPRETATION OF PHQ9 SCORE: NO FURTHER SCREENING NEEDED
2. FEELING DOWN, DEPRESSED OR HOPELESS: SEVERAL DAYS
CLINICAL INTERPRETATION OF PHQ2 SCORE: NO FURTHER SCREENING NEEDED
SUM OF ALL RESPONSES TO PHQ9 QUESTIONS 1 AND 2: 1
SUM OF ALL RESPONSES TO PHQ9 QUESTIONS 1 AND 2: 1
1. LITTLE INTEREST OR PLEASURE IN DOING THINGS: NOT AT ALL

## 2021-10-20 ASSESSMENT — PAIN SCALES - GENERAL: PAINLEVEL: 0

## 2021-12-07 ENCOUNTER — OFFICE VISIT (OUTPATIENT)
Dept: PODIATRY CLINIC | Facility: CLINIC | Age: 81
End: 2021-12-07
Payer: COMMERCIAL

## 2021-12-07 DIAGNOSIS — L84 HELOMA DURUM: ICD-10-CM

## 2021-12-07 DIAGNOSIS — M20.41 HAMMER TOES OF BOTH FEET: ICD-10-CM

## 2021-12-07 DIAGNOSIS — L60.0 INGROWING NAIL, RIGHT GREAT TOE: ICD-10-CM

## 2021-12-07 DIAGNOSIS — M79.675 PAIN IN TOES OF BOTH FEET: ICD-10-CM

## 2021-12-07 DIAGNOSIS — M20.42 HAMMER TOES OF BOTH FEET: ICD-10-CM

## 2021-12-07 DIAGNOSIS — L84 CALLUS OF FOOT: ICD-10-CM

## 2021-12-07 DIAGNOSIS — M79.674 PAIN IN TOES OF BOTH FEET: ICD-10-CM

## 2021-12-07 DIAGNOSIS — M21.6X9 PLANTAR FLEXED METATARSAL, UNSPECIFIED LATERALITY: Primary | ICD-10-CM

## 2021-12-07 DIAGNOSIS — G20 PARKINSON DISEASE (HCC): ICD-10-CM

## 2021-12-07 DIAGNOSIS — M77.41 METATARSALGIA OF BOTH FEET: ICD-10-CM

## 2021-12-07 DIAGNOSIS — B35.1 ONYCHOMYCOSIS: ICD-10-CM

## 2021-12-07 DIAGNOSIS — M77.42 METATARSALGIA OF BOTH FEET: ICD-10-CM

## 2021-12-07 PROCEDURE — 11721 DEBRIDE NAIL 6 OR MORE: CPT | Performed by: PODIATRIST

## 2021-12-16 NOTE — PROGRESS NOTES
Karla Ordoñez is a [de-identified]year old male. Patient presents with:  Toenail Care: Routine nail care and calluses, pt has no c/o no pain today.         HPI:   This pleasant gentleman known history of Parkinson's disease and inability to trim his nails presents co use: No    Other Topics      Concerns:        Caffeine Concern: No        Exercise: No          REVIEW OF SYSTEMS:   Today reviewed systens as documented below  GENERAL HEALTH: feels well otherwise  SKIN: Refer to exam below  RESPIRATORY: denies shortness agrees to the plan.     Edy Hurley DPM

## 2022-03-03 ENCOUNTER — V-VISIT (OUTPATIENT)
Dept: DERMATOLOGY | Age: 82
End: 2022-03-03

## 2022-03-03 DIAGNOSIS — L30.4 INTERTRIGO: Primary | ICD-10-CM

## 2022-03-03 PROCEDURE — 99212 OFFICE O/P EST SF 10 MIN: CPT | Performed by: DERMATOLOGY

## 2022-03-15 ENCOUNTER — OFFICE VISIT (OUTPATIENT)
Dept: DERMATOLOGY | Age: 82
End: 2022-03-15

## 2022-03-15 DIAGNOSIS — Z12.83 SCREENING EXAM FOR SKIN CANCER: ICD-10-CM

## 2022-03-15 DIAGNOSIS — L30.9 ECZEMA, UNSPECIFIED TYPE: Primary | ICD-10-CM

## 2022-03-15 PROCEDURE — 99214 OFFICE O/P EST MOD 30 MIN: CPT | Performed by: DERMATOLOGY

## 2022-03-16 ENCOUNTER — TELEPHONE (OUTPATIENT)
Dept: DERMATOLOGY | Age: 82
End: 2022-03-16

## 2022-03-19 ENCOUNTER — TELEPHONE (OUTPATIENT)
Dept: INTERNAL MEDICINE | Age: 82
End: 2022-03-19

## 2022-03-19 DIAGNOSIS — E55.9 VITAMIN D DEFICIENCY: Primary | ICD-10-CM

## 2022-03-23 ENCOUNTER — LAB SERVICES (OUTPATIENT)
Dept: LAB | Age: 82
End: 2022-03-23

## 2022-03-23 DIAGNOSIS — E55.9 VITAMIN D DEFICIENCY: ICD-10-CM

## 2022-03-23 DIAGNOSIS — N18.30 STAGE 3 CHRONIC KIDNEY DISEASE, UNSPECIFIED WHETHER STAGE 3A OR 3B CKD (CMD): ICD-10-CM

## 2022-03-23 DIAGNOSIS — E78.49 OTHER HYPERLIPIDEMIA: ICD-10-CM

## 2022-03-23 DIAGNOSIS — E78.5 HYPERLIPIDEMIA, UNSPECIFIED HYPERLIPIDEMIA TYPE: ICD-10-CM

## 2022-03-23 LAB
BASOPHIL %: 0.6 % (ref 0–1.2)
BASOPHIL ABSOLUTE #: 0.1 10*3/UL (ref 0–0.1)
DIFFERENTIAL TYPE: NORMAL
EOSINOPHIL %: 3.2 % (ref 0–10)
EOSINOPHIL ABSOLUTE #: 0.3 10*3/UL (ref 0–0.5)
HEMATOCRIT: 47.9 % (ref 40–51)
HEMOGLOBIN: 16.2 G/DL (ref 13.7–17.5)
IMMATURE GRANULOCYTE ABSOLUTE: 0.02 10*3/UL (ref 0–0.05)
IMMATURE GRANULOCYTE PERCENT: 0.2 % (ref 0–0.5)
LYMPH PERCENT: 32 % (ref 20.5–51.1)
LYMPHOCYTE ABSOLUTE #: 2.7 10*3/UL (ref 1.2–3.4)
MEAN CORPUSCULAR HGB CONCENTRATION: 33.8 % (ref 32–36)
MEAN CORPUSCULAR HGB: 31.8 PG (ref 27–34)
MEAN CORPUSCULAR VOLUME: 94.1 FL (ref 79–95)
MEAN PLATELET VOLUME: 10 FL (ref 8.6–12.4)
MONOCYTE ABSOLUTE #: 0.7 10*3/UL (ref 0.2–0.9)
MONOCYTE PERCENT: 8.2 % (ref 4.3–12.9)
NEUTROPHIL ABSOLUTE #: 4.8 10*3/UL (ref 1.4–6.5)
NEUTROPHIL PERCENT: 55.8 % (ref 34–73.5)
PLATELET COUNT: 209 10*3/UL (ref 150–400)
RED BLOOD CELL COUNT: 5.09 10*6/UL (ref 3.9–5.7)
RED CELL DISTRIBUTION WIDTH: 13.2 % (ref 11.3–14.8)
WHITE BLOOD CELL COUNT: 8.6 10*3/UL (ref 4–10)

## 2022-03-23 PROCEDURE — 80061 LIPID PANEL: CPT | Performed by: INTERNAL MEDICINE

## 2022-03-23 PROCEDURE — 84443 ASSAY THYROID STIM HORMONE: CPT | Performed by: INTERNAL MEDICINE

## 2022-03-23 PROCEDURE — 85025 COMPLETE CBC W/AUTO DIFF WBC: CPT | Performed by: INTERNAL MEDICINE

## 2022-03-23 PROCEDURE — 80048 BASIC METABOLIC PNL TOTAL CA: CPT | Performed by: INTERNAL MEDICINE

## 2022-03-23 PROCEDURE — 80076 HEPATIC FUNCTION PANEL: CPT | Performed by: INTERNAL MEDICINE

## 2022-03-23 PROCEDURE — 36415 COLL VENOUS BLD VENIPUNCTURE: CPT | Performed by: INTERNAL MEDICINE

## 2022-03-23 PROCEDURE — 82306 VITAMIN D 25 HYDROXY: CPT | Performed by: INTERNAL MEDICINE

## 2022-03-24 LAB
25(OH)D3 SERPL-MCNC: 50.1 NG/ML (ref 30–100)
ALBUMIN SERPL-MCNC: 4.6 G/DL (ref 3.6–5.1)
ALP SERPL-CCNC: 71 U/L (ref 45–115)
ALT SERPL W/O P-5'-P-CCNC: 10 U/L (ref 5–49)
AST SERPL-CCNC: 34 U/L (ref 14–43)
BILIRUB CONJ SERPL-MCNC: 0 MG/DL (ref 0–0.3)
BILIRUB SERPL-MCNC: 0.9 MG/DL (ref 0–1.3)
BUN SERPL-MCNC: 21 MG/DL (ref 6–27)
CALCIUM SERPL-MCNC: 9.7 MG/DL (ref 8.6–10.6)
CHLORIDE SERPL-SCNC: 103 MMOL/L (ref 96–107)
CHOLEST SERPL-MCNC: 135 MG/DL (ref 140–200)
CO2 SERPL-SCNC: 27 MMOL/L (ref 22–32)
CREAT SERPL-MCNC: 1.6 MG/DL (ref 0.6–1.6)
GFR SERPL CREATININE-BSD FRML MDRD: 42 ML/MIN/{1.73M2}
GFR SERPL CREATININE-BSD FRML MDRD: 51 ML/MIN/{1.73M2}
GLUCOSE P FAST SERPL-MCNC: 99 MG/DL (ref 60–100)
HDLC SERPL-MCNC: 38 MG/DL
LDLC SERPL CALC-MCNC: 68 MG/DL (ref 30–100)
POTASSIUM SERPL-SCNC: 4.5 MMOL/L (ref 3.5–5.3)
PROT SERPL-MCNC: 7.2 G/DL (ref 6.4–8.5)
SODIUM SERPL-SCNC: 138 MMOL/L (ref 136–146)
TRIGL SERPL-MCNC: 143 MG/DL (ref 0–200)
TSH SERPL DL<=0.05 MIU/L-ACNC: 1.53 M[IU]/L (ref 0.3–4.82)

## 2022-04-21 ENCOUNTER — OFFICE VISIT (OUTPATIENT)
Dept: INTERNAL MEDICINE | Age: 82
End: 2022-04-21

## 2022-04-21 VITALS
SYSTOLIC BLOOD PRESSURE: 124 MMHG | TEMPERATURE: 98.4 F | HEART RATE: 86 BPM | HEIGHT: 64 IN | DIASTOLIC BLOOD PRESSURE: 78 MMHG | WEIGHT: 164 LBS | OXYGEN SATURATION: 96 % | BODY MASS INDEX: 28 KG/M2

## 2022-04-21 DIAGNOSIS — E78.49 OTHER HYPERLIPIDEMIA: ICD-10-CM

## 2022-04-21 DIAGNOSIS — M81.0 OSTEOPOROSIS, UNSPECIFIED OSTEOPOROSIS TYPE, UNSPECIFIED PATHOLOGICAL FRACTURE PRESENCE: Primary | ICD-10-CM

## 2022-04-21 DIAGNOSIS — N18.30 STAGE 3 CHRONIC KIDNEY DISEASE, UNSPECIFIED WHETHER STAGE 3A OR 3B CKD (CMD): ICD-10-CM

## 2022-04-21 DIAGNOSIS — G20.A1 PARKINSON'S DISEASE: ICD-10-CM

## 2022-04-21 DIAGNOSIS — I51.89 DIASTOLIC DYSFUNCTION: ICD-10-CM

## 2022-04-21 DIAGNOSIS — Z00.00 ROUTINE GENERAL MEDICAL EXAMINATION AT A HEALTH CARE FACILITY: ICD-10-CM

## 2022-04-21 PROCEDURE — G0439 PPPS, SUBSEQ VISIT: HCPCS | Performed by: INTERNAL MEDICINE

## 2022-04-21 PROCEDURE — 3078F DIAST BP <80 MM HG: CPT | Performed by: INTERNAL MEDICINE

## 2022-04-21 PROCEDURE — 99214 OFFICE O/P EST MOD 30 MIN: CPT | Performed by: INTERNAL MEDICINE

## 2022-04-21 PROCEDURE — 3074F SYST BP LT 130 MM HG: CPT | Performed by: INTERNAL MEDICINE

## 2022-04-21 ASSESSMENT — PATIENT HEALTH QUESTIONNAIRE - PHQ9
SUM OF ALL RESPONSES TO PHQ9 QUESTIONS 1 AND 2: 1
1. LITTLE INTEREST OR PLEASURE IN DOING THINGS: NOT AT ALL
CLINICAL INTERPRETATION OF PHQ2 SCORE: NO FURTHER SCREENING NEEDED
2. FEELING DOWN, DEPRESSED OR HOPELESS: SEVERAL DAYS
SUM OF ALL RESPONSES TO PHQ9 QUESTIONS 1 AND 2: 1

## 2022-05-23 ENCOUNTER — IMAGING SERVICES (OUTPATIENT)
Dept: BONE DENSITY | Age: 82
End: 2022-05-23
Attending: INTERNAL MEDICINE

## 2022-05-23 DIAGNOSIS — M81.0 OSTEOPOROSIS, UNSPECIFIED OSTEOPOROSIS TYPE, UNSPECIFIED PATHOLOGICAL FRACTURE PRESENCE: ICD-10-CM

## 2022-05-23 PROCEDURE — 77080 DXA BONE DENSITY AXIAL: CPT | Performed by: RADIOLOGY

## 2022-05-24 ENCOUNTER — TELEPHONE (OUTPATIENT)
Dept: INTERNAL MEDICINE | Age: 82
End: 2022-05-24

## 2022-06-03 ENCOUNTER — TELEPHONE (OUTPATIENT)
Dept: FAMILY MEDICINE | Age: 82
End: 2022-06-03

## 2022-06-07 RX ORDER — ATORVASTATIN CALCIUM 10 MG/1
10 TABLET, FILM COATED ORAL DAILY
Qty: 90 TABLET | Refills: 3 | Status: SHIPPED | OUTPATIENT
Start: 2022-06-07 | End: 2023-06-28

## 2022-06-07 RX ORDER — TAMSULOSIN HYDROCHLORIDE 0.4 MG/1
0.4 CAPSULE ORAL EVERY EVENING
Qty: 90 CAPSULE | Refills: 3 | Status: SHIPPED | OUTPATIENT
Start: 2022-06-07 | End: 2023-10-16

## 2022-06-07 RX ORDER — FINASTERIDE 5 MG/1
5 TABLET, FILM COATED ORAL DAILY
Qty: 90 TABLET | Refills: 3 | Status: SHIPPED | OUTPATIENT
Start: 2022-06-07 | End: 2023-08-08

## 2022-08-28 PROCEDURE — 99220 INITIAL OBSERVATION CARE,LEVL III: CPT | Performed by: HOSPITALIST

## 2022-08-29 PROCEDURE — 99233 SBSQ HOSP IP/OBS HIGH 50: CPT | Performed by: HOSPITALIST

## 2022-08-30 ENCOUNTER — OFF PREMISE (OUTPATIENT)
Dept: HEALTH INFORMATION MANAGEMENT | Facility: OTHER | Age: 82
End: 2022-08-30

## 2022-08-30 PROCEDURE — 99233 SBSQ HOSP IP/OBS HIGH 50: CPT | Performed by: HOSPITALIST

## 2022-08-31 PROCEDURE — 99233 SBSQ HOSP IP/OBS HIGH 50: CPT | Performed by: HOSPITALIST

## 2022-09-01 PROCEDURE — 99233 SBSQ HOSP IP/OBS HIGH 50: CPT | Performed by: HOSPITALIST

## 2022-09-02 PROCEDURE — 99233 SBSQ HOSP IP/OBS HIGH 50: CPT | Performed by: HOSPITALIST

## 2022-09-03 PROCEDURE — 99239 HOSP IP/OBS DSCHRG MGMT >30: CPT | Performed by: HOSPITALIST

## 2022-09-06 ENCOUNTER — TELEPHONE (OUTPATIENT)
Dept: OTHER | Facility: OTHER | Age: 82
End: 2022-09-06

## 2022-09-06 ENCOUNTER — NURSING HOME VISIT (OUTPATIENT)
Dept: POST ACUTE CARE | Age: 82
End: 2022-09-06

## 2022-09-06 VITALS
RESPIRATION RATE: 16 BRPM | OXYGEN SATURATION: 95 % | TEMPERATURE: 97.4 F | DIASTOLIC BLOOD PRESSURE: 89 MMHG | SYSTOLIC BLOOD PRESSURE: 166 MMHG | HEART RATE: 67 BPM

## 2022-09-06 DIAGNOSIS — N18.30 STAGE 3 CHRONIC KIDNEY DISEASE, UNSPECIFIED WHETHER STAGE 3A OR 3B CKD (CMD): ICD-10-CM

## 2022-09-06 DIAGNOSIS — R53.81 DEBILITY: ICD-10-CM

## 2022-09-06 DIAGNOSIS — I63.9 ACUTE CVA (CEREBROVASCULAR ACCIDENT) (CMD): Primary | ICD-10-CM

## 2022-09-06 DIAGNOSIS — I10 PRIMARY HYPERTENSION: ICD-10-CM

## 2022-09-06 DIAGNOSIS — Z71.89 ACP (ADVANCE CARE PLANNING): ICD-10-CM

## 2022-09-06 DIAGNOSIS — N40.1 BENIGN PROSTATIC HYPERPLASIA WITH URINARY OBSTRUCTION: ICD-10-CM

## 2022-09-06 DIAGNOSIS — G20.A1 PARKINSON'S DISEASE: ICD-10-CM

## 2022-09-06 DIAGNOSIS — M81.0 OSTEOPOROSIS, UNSPECIFIED OSTEOPOROSIS TYPE, UNSPECIFIED PATHOLOGICAL FRACTURE PRESENCE: ICD-10-CM

## 2022-09-06 DIAGNOSIS — N13.8 BENIGN PROSTATIC HYPERPLASIA WITH URINARY OBSTRUCTION: ICD-10-CM

## 2022-09-06 PROCEDURE — 1170F FXNL STATUS ASSESSED: CPT | Performed by: FAMILY MEDICINE

## 2022-09-06 PROCEDURE — 99306 1ST NF CARE HIGH MDM 50: CPT | Performed by: FAMILY MEDICINE

## 2022-09-06 PROCEDURE — 1160F RVW MEDS BY RX/DR IN RCRD: CPT | Performed by: FAMILY MEDICINE

## 2022-09-06 PROCEDURE — 1157F ADVNC CARE PLAN IN RCRD: CPT | Performed by: FAMILY MEDICINE

## 2022-09-06 PROCEDURE — 1158F ADVNC CARE PLAN TLK DOCD: CPT | Performed by: FAMILY MEDICINE

## 2022-09-06 PROCEDURE — 1126F AMNT PAIN NOTED NONE PRSNT: CPT | Performed by: FAMILY MEDICINE

## 2022-09-06 PROCEDURE — 99497 ADVNCD CARE PLAN 30 MIN: CPT | Performed by: FAMILY MEDICINE

## 2022-09-06 ASSESSMENT — ENCOUNTER SYMPTOMS
COLOR CHANGE: 0
SORE THROAT: 0
POLYDIPSIA: 0
CONSTIPATION: 0
WEAKNESS: 1
RHINORRHEA: 0
AGITATION: 0
HEADACHES: 0
POLYPHAGIA: 0
BACK PAIN: 0
SLEEP DISTURBANCE: 0
CHILLS: 0
PHOTOPHOBIA: 0
DIARRHEA: 0
EYE REDNESS: 0
SINUS PRESSURE: 0
NAUSEA: 0
VOMITING: 0
CONFUSION: 1
DIZZINESS: 0
LIGHT-HEADEDNESS: 0
SHORTNESS OF BREATH: 0
ACTIVITY CHANGE: 1
SINUS PAIN: 0
NERVOUS/ANXIOUS: 0
FEVER: 0
WHEEZING: 0

## 2022-09-07 ENCOUNTER — NURSING HOME VISIT (OUTPATIENT)
Dept: POST ACUTE CARE | Age: 82
End: 2022-09-07

## 2022-09-07 VITALS
DIASTOLIC BLOOD PRESSURE: 76 MMHG | SYSTOLIC BLOOD PRESSURE: 134 MMHG | TEMPERATURE: 98.3 F | HEART RATE: 73 BPM | RESPIRATION RATE: 20 BRPM | OXYGEN SATURATION: 96 %

## 2022-09-07 DIAGNOSIS — I63.9 ACUTE CVA (CEREBROVASCULAR ACCIDENT) (CMD): ICD-10-CM

## 2022-09-07 DIAGNOSIS — M81.0 OSTEOPOROSIS, UNSPECIFIED OSTEOPOROSIS TYPE, UNSPECIFIED PATHOLOGICAL FRACTURE PRESENCE: ICD-10-CM

## 2022-09-07 DIAGNOSIS — N40.1 BENIGN PROSTATIC HYPERPLASIA WITH URINARY OBSTRUCTION: ICD-10-CM

## 2022-09-07 DIAGNOSIS — I10 PRIMARY HYPERTENSION: ICD-10-CM

## 2022-09-07 DIAGNOSIS — R53.81 DEBILITY: ICD-10-CM

## 2022-09-07 DIAGNOSIS — G20.A1 PARKINSON'S DISEASE: ICD-10-CM

## 2022-09-07 DIAGNOSIS — N18.30 STAGE 3 CHRONIC KIDNEY DISEASE, UNSPECIFIED WHETHER STAGE 3A OR 3B CKD (CMD): ICD-10-CM

## 2022-09-07 DIAGNOSIS — N13.8 BENIGN PROSTATIC HYPERPLASIA WITH URINARY OBSTRUCTION: ICD-10-CM

## 2022-09-07 DIAGNOSIS — D72.829 LEUKOCYTOSIS, UNSPECIFIED TYPE: Primary | ICD-10-CM

## 2022-09-07 DIAGNOSIS — R41.0 DELIRIUM: ICD-10-CM

## 2022-09-07 PROCEDURE — 1126F AMNT PAIN NOTED NONE PRSNT: CPT

## 2022-09-07 PROCEDURE — 1157F ADVNC CARE PLAN IN RCRD: CPT

## 2022-09-07 PROCEDURE — 99497 ADVNCD CARE PLAN 30 MIN: CPT

## 2022-09-07 PROCEDURE — 99310 SBSQ NF CARE HIGH MDM 45: CPT

## 2022-09-07 PROCEDURE — 1160F RVW MEDS BY RX/DR IN RCRD: CPT

## 2022-09-07 ASSESSMENT — ENCOUNTER SYMPTOMS
EYE DISCHARGE: 0
SLEEP DISTURBANCE: 0
CONSTIPATION: 0
AGITATION: 0
ABDOMINAL PAIN: 0
SORE THROAT: 0
CHILLS: 0
BACK PAIN: 0
FEVER: 0
EYE PAIN: 0
SINUS PAIN: 0
VOMITING: 0
SHORTNESS OF BREATH: 0
DIZZINESS: 0
COLOR CHANGE: 0
ACTIVITY CHANGE: 1
NERVOUS/ANXIOUS: 1
DIARRHEA: 0
SINUS PRESSURE: 0
CONFUSION: 1
HEADACHES: 0
NAUSEA: 0
CHEST TIGHTNESS: 0
LIGHT-HEADEDNESS: 0
EYE ITCHING: 0
COUGH: 0
POLYPHAGIA: 0
POLYDIPSIA: 0
RHINORRHEA: 0
WEAKNESS: 1

## 2022-09-07 ASSESSMENT — PAIN SCALES - GENERAL: PAINLEVEL: 0

## 2022-09-09 ENCOUNTER — NURSING HOME VISIT (OUTPATIENT)
Dept: POST ACUTE CARE | Age: 82
End: 2022-09-09

## 2022-09-09 VITALS
HEART RATE: 65 BPM | SYSTOLIC BLOOD PRESSURE: 153 MMHG | TEMPERATURE: 97.6 F | OXYGEN SATURATION: 93 % | RESPIRATION RATE: 16 BRPM | DIASTOLIC BLOOD PRESSURE: 81 MMHG

## 2022-09-09 DIAGNOSIS — N18.30 STAGE 3 CHRONIC KIDNEY DISEASE, UNSPECIFIED WHETHER STAGE 3A OR 3B CKD (CMD): ICD-10-CM

## 2022-09-09 DIAGNOSIS — I10 PRIMARY HYPERTENSION: ICD-10-CM

## 2022-09-09 DIAGNOSIS — D72.829 LEUKOCYTOSIS, UNSPECIFIED TYPE: Primary | ICD-10-CM

## 2022-09-09 DIAGNOSIS — I63.9 ACUTE CVA (CEREBROVASCULAR ACCIDENT) (CMD): ICD-10-CM

## 2022-09-09 DIAGNOSIS — R41.0 DELIRIUM: ICD-10-CM

## 2022-09-09 DIAGNOSIS — G20.A1 PARKINSON'S DISEASE: ICD-10-CM

## 2022-09-09 DIAGNOSIS — R53.81 DEBILITY: ICD-10-CM

## 2022-09-09 PROCEDURE — 1160F RVW MEDS BY RX/DR IN RCRD: CPT

## 2022-09-09 PROCEDURE — 99308 SBSQ NF CARE LOW MDM 20: CPT

## 2022-09-09 PROCEDURE — 1126F AMNT PAIN NOTED NONE PRSNT: CPT

## 2022-09-09 RX ORDER — QUETIAPINE FUMARATE 25 MG/1
25 TABLET, FILM COATED ORAL NIGHTLY
COMMUNITY
End: 2022-10-13

## 2022-09-09 ASSESSMENT — ENCOUNTER SYMPTOMS
AGITATION: 0
ABDOMINAL PAIN: 0
CONSTIPATION: 0
SINUS PRESSURE: 0
DIARRHEA: 0
NERVOUS/ANXIOUS: 1
DIZZINESS: 0
CHILLS: 0
SINUS PAIN: 0
LIGHT-HEADEDNESS: 0
WEAKNESS: 1
RHINORRHEA: 0
VOMITING: 0
HEADACHES: 0
COLOR CHANGE: 0
COUGH: 0
ACTIVITY CHANGE: 1
CHEST TIGHTNESS: 0
SHORTNESS OF BREATH: 0
SORE THROAT: 0
FEVER: 0
CONFUSION: 1
SLEEP DISTURBANCE: 0
NAUSEA: 0
BACK PAIN: 0

## 2022-09-09 ASSESSMENT — PAIN SCALES - GENERAL: PAINLEVEL: 0

## 2022-09-12 ENCOUNTER — NURSING HOME VISIT (OUTPATIENT)
Dept: POST ACUTE CARE | Age: 82
End: 2022-09-12

## 2022-09-12 VITALS
BODY MASS INDEX: 27.98 KG/M2 | WEIGHT: 163 LBS | SYSTOLIC BLOOD PRESSURE: 152 MMHG | TEMPERATURE: 98.5 F | HEART RATE: 65 BPM | OXYGEN SATURATION: 94 % | DIASTOLIC BLOOD PRESSURE: 95 MMHG | RESPIRATION RATE: 18 BRPM

## 2022-09-12 DIAGNOSIS — G20.A1 PARKINSON'S DISEASE: ICD-10-CM

## 2022-09-12 DIAGNOSIS — R41.0 DELIRIUM: ICD-10-CM

## 2022-09-12 DIAGNOSIS — R53.81 DEBILITY: ICD-10-CM

## 2022-09-12 DIAGNOSIS — I63.9 ACUTE CVA (CEREBROVASCULAR ACCIDENT) (CMD): Primary | ICD-10-CM

## 2022-09-12 DIAGNOSIS — N18.30 STAGE 3 CHRONIC KIDNEY DISEASE, UNSPECIFIED WHETHER STAGE 3A OR 3B CKD (CMD): ICD-10-CM

## 2022-09-12 DIAGNOSIS — I10 PRIMARY HYPERTENSION: ICD-10-CM

## 2022-09-12 PROCEDURE — 1126F AMNT PAIN NOTED NONE PRSNT: CPT

## 2022-09-12 PROCEDURE — 99308 SBSQ NF CARE LOW MDM 20: CPT

## 2022-09-12 ASSESSMENT — ENCOUNTER SYMPTOMS
FEVER: 0
DIZZINESS: 0
SHORTNESS OF BREATH: 0
ACTIVITY CHANGE: 1
NERVOUS/ANXIOUS: 1
ABDOMINAL PAIN: 0
BACK PAIN: 0
CHILLS: 0
RHINORRHEA: 0
HEADACHES: 0
CONSTIPATION: 0
DIARRHEA: 0
COLOR CHANGE: 0
NAUSEA: 0
WEAKNESS: 1
VOMITING: 0
SINUS PAIN: 0
SLEEP DISTURBANCE: 0
CHEST TIGHTNESS: 0
LIGHT-HEADEDNESS: 0
SINUS PRESSURE: 0
CONFUSION: 1
AGITATION: 0
SORE THROAT: 0
COUGH: 0

## 2022-09-12 ASSESSMENT — PAIN SCALES - GENERAL: PAINLEVEL: 0

## 2022-09-14 ENCOUNTER — NURSING HOME VISIT (OUTPATIENT)
Dept: POST ACUTE CARE | Age: 82
End: 2022-09-14

## 2022-09-14 VITALS
SYSTOLIC BLOOD PRESSURE: 112 MMHG | DIASTOLIC BLOOD PRESSURE: 65 MMHG | TEMPERATURE: 98 F | OXYGEN SATURATION: 94 % | HEART RATE: 80 BPM | RESPIRATION RATE: 16 BRPM

## 2022-09-14 DIAGNOSIS — R53.81 DEBILITY: ICD-10-CM

## 2022-09-14 DIAGNOSIS — N18.30 STAGE 3 CHRONIC KIDNEY DISEASE, UNSPECIFIED WHETHER STAGE 3A OR 3B CKD (CMD): ICD-10-CM

## 2022-09-14 DIAGNOSIS — I63.9 ACUTE CVA (CEREBROVASCULAR ACCIDENT) (CMD): ICD-10-CM

## 2022-09-14 DIAGNOSIS — G20.A1 PARKINSON'S DISEASE: ICD-10-CM

## 2022-09-14 DIAGNOSIS — R41.0 DELIRIUM: ICD-10-CM

## 2022-09-14 DIAGNOSIS — I10 PRIMARY HYPERTENSION: ICD-10-CM

## 2022-09-14 DIAGNOSIS — D72.829 LEUKOCYTOSIS, UNSPECIFIED TYPE: Primary | ICD-10-CM

## 2022-09-14 PROCEDURE — 1170F FXNL STATUS ASSESSED: CPT

## 2022-09-14 PROCEDURE — 99308 SBSQ NF CARE LOW MDM 20: CPT

## 2022-09-14 PROCEDURE — 1126F AMNT PAIN NOTED NONE PRSNT: CPT

## 2022-09-14 ASSESSMENT — ENCOUNTER SYMPTOMS
SHORTNESS OF BREATH: 0
VOMITING: 0
SLEEP DISTURBANCE: 0
CHEST TIGHTNESS: 0
SINUS PRESSURE: 0
HEADACHES: 0
NERVOUS/ANXIOUS: 0
WEAKNESS: 1
AGITATION: 0
DIZZINESS: 0
ACTIVITY CHANGE: 1
SORE THROAT: 0
FEVER: 0
BACK PAIN: 0
LIGHT-HEADEDNESS: 0
NAUSEA: 0
ABDOMINAL PAIN: 0
CHILLS: 0
RHINORRHEA: 0
SINUS PAIN: 0
CONFUSION: 1
DIARRHEA: 0
COUGH: 0
COLOR CHANGE: 0
CONSTIPATION: 1

## 2022-09-14 ASSESSMENT — PAIN SCALES - GENERAL: PAINLEVEL: 0

## 2022-09-15 ENCOUNTER — NURSING HOME VISIT (OUTPATIENT)
Dept: POST ACUTE CARE | Age: 82
End: 2022-09-15

## 2022-09-15 VITALS
OXYGEN SATURATION: 95 % | SYSTOLIC BLOOD PRESSURE: 148 MMHG | RESPIRATION RATE: 18 BRPM | HEART RATE: 78 BPM | DIASTOLIC BLOOD PRESSURE: 64 MMHG | TEMPERATURE: 98 F

## 2022-09-15 DIAGNOSIS — I51.89 DIASTOLIC DYSFUNCTION: ICD-10-CM

## 2022-09-15 DIAGNOSIS — N40.1 BENIGN PROSTATIC HYPERPLASIA WITH URINARY OBSTRUCTION: ICD-10-CM

## 2022-09-15 DIAGNOSIS — M81.0 OSTEOPOROSIS, UNSPECIFIED OSTEOPOROSIS TYPE, UNSPECIFIED PATHOLOGICAL FRACTURE PRESENCE: ICD-10-CM

## 2022-09-15 DIAGNOSIS — R53.81 DEBILITY: ICD-10-CM

## 2022-09-15 DIAGNOSIS — I10 PRIMARY HYPERTENSION: ICD-10-CM

## 2022-09-15 DIAGNOSIS — I63.9 ACUTE CVA (CEREBROVASCULAR ACCIDENT) (CMD): Primary | ICD-10-CM

## 2022-09-15 DIAGNOSIS — R41.0 DELIRIUM: ICD-10-CM

## 2022-09-15 DIAGNOSIS — G20.A1 PARKINSON'S DISEASE: ICD-10-CM

## 2022-09-15 DIAGNOSIS — N13.8 BENIGN PROSTATIC HYPERPLASIA WITH URINARY OBSTRUCTION: ICD-10-CM

## 2022-09-15 PROBLEM — D72.829 LEUCOCYTOSIS: Status: RESOLVED | Noted: 2022-09-07 | Resolved: 2022-09-15

## 2022-09-15 PROCEDURE — 99310 SBSQ NF CARE HIGH MDM 45: CPT | Performed by: FAMILY MEDICINE

## 2022-09-15 ASSESSMENT — ENCOUNTER SYMPTOMS
PHOTOPHOBIA: 0
DIZZINESS: 0
POLYPHAGIA: 0
SINUS PAIN: 0
WHEEZING: 0
EYE REDNESS: 0
SLEEP DISTURBANCE: 0
FEVER: 0
NAUSEA: 0
DIARRHEA: 0
CONSTIPATION: 0
WEAKNESS: 1
HEADACHES: 0
COLOR CHANGE: 0
VOMITING: 0
SORE THROAT: 0
CONFUSION: 1
BACK PAIN: 0
SHORTNESS OF BREATH: 0
ACTIVITY CHANGE: 1
NERVOUS/ANXIOUS: 0
CHILLS: 0
RHINORRHEA: 0
AGITATION: 0
LIGHT-HEADEDNESS: 0
SINUS PRESSURE: 0
POLYDIPSIA: 0

## 2022-09-19 ENCOUNTER — NURSING HOME VISIT (OUTPATIENT)
Dept: POST ACUTE CARE | Age: 82
End: 2022-09-19

## 2022-09-19 VITALS
HEART RATE: 59 BPM | OXYGEN SATURATION: 96 % | DIASTOLIC BLOOD PRESSURE: 77 MMHG | TEMPERATURE: 97.8 F | SYSTOLIC BLOOD PRESSURE: 125 MMHG | RESPIRATION RATE: 14 BRPM

## 2022-09-19 DIAGNOSIS — I10 PRIMARY HYPERTENSION: ICD-10-CM

## 2022-09-19 DIAGNOSIS — N18.32 STAGE 3B CHRONIC KIDNEY DISEASE (CMD): ICD-10-CM

## 2022-09-19 DIAGNOSIS — I63.9 ACUTE CVA (CEREBROVASCULAR ACCIDENT) (CMD): Primary | ICD-10-CM

## 2022-09-19 DIAGNOSIS — G20.A1 PARKINSON'S DISEASE: ICD-10-CM

## 2022-09-19 DIAGNOSIS — R53.81 DEBILITY: ICD-10-CM

## 2022-09-19 DIAGNOSIS — R41.0 DELIRIUM: ICD-10-CM

## 2022-09-19 PROCEDURE — 99308 SBSQ NF CARE LOW MDM 20: CPT

## 2022-09-19 PROCEDURE — 1160F RVW MEDS BY RX/DR IN RCRD: CPT

## 2022-09-19 PROCEDURE — 1126F AMNT PAIN NOTED NONE PRSNT: CPT

## 2022-09-19 ASSESSMENT — ENCOUNTER SYMPTOMS
LIGHT-HEADEDNESS: 0
CONFUSION: 1
AGITATION: 0
SORE THROAT: 0
SINUS PRESSURE: 0
COUGH: 0
RHINORRHEA: 0
SHORTNESS OF BREATH: 0
DIARRHEA: 0
CHEST TIGHTNESS: 0
COLOR CHANGE: 0
FEVER: 0
WEAKNESS: 1
CONSTIPATION: 0
ACTIVITY CHANGE: 1
DIZZINESS: 0
HEADACHES: 0
FATIGUE: 1
NERVOUS/ANXIOUS: 0
ABDOMINAL PAIN: 0
CHILLS: 0
NAUSEA: 0
BACK PAIN: 0
SINUS PAIN: 0
VOMITING: 0
SLEEP DISTURBANCE: 0

## 2022-09-19 ASSESSMENT — PAIN SCALES - GENERAL: PAINLEVEL: 0

## 2022-09-21 ENCOUNTER — NURSING HOME VISIT (OUTPATIENT)
Dept: POST ACUTE CARE | Age: 82
End: 2022-09-21

## 2022-09-21 VITALS
SYSTOLIC BLOOD PRESSURE: 160 MMHG | HEART RATE: 67 BPM | DIASTOLIC BLOOD PRESSURE: 85 MMHG | WEIGHT: 162 LBS | BODY MASS INDEX: 27.81 KG/M2 | RESPIRATION RATE: 20 BRPM | OXYGEN SATURATION: 95 % | TEMPERATURE: 97 F

## 2022-09-21 DIAGNOSIS — R41.0 DELIRIUM: ICD-10-CM

## 2022-09-21 DIAGNOSIS — G20.A1 PARKINSON'S DISEASE: ICD-10-CM

## 2022-09-21 DIAGNOSIS — I63.9 ACUTE CVA (CEREBROVASCULAR ACCIDENT) (CMD): Primary | ICD-10-CM

## 2022-09-21 DIAGNOSIS — R53.81 DEBILITY: ICD-10-CM

## 2022-09-21 DIAGNOSIS — I10 PRIMARY HYPERTENSION: ICD-10-CM

## 2022-09-21 PROCEDURE — 99308 SBSQ NF CARE LOW MDM 20: CPT

## 2022-09-21 PROCEDURE — 1126F AMNT PAIN NOTED NONE PRSNT: CPT

## 2022-09-21 PROCEDURE — 1170F FXNL STATUS ASSESSED: CPT

## 2022-09-21 ASSESSMENT — ENCOUNTER SYMPTOMS
NAUSEA: 0
SINUS PAIN: 0
SLEEP DISTURBANCE: 0
CONSTIPATION: 0
RHINORRHEA: 0
SORE THROAT: 0
VOMITING: 0
NERVOUS/ANXIOUS: 0
ABDOMINAL PAIN: 0
FATIGUE: 0
CONFUSION: 0
CHEST TIGHTNESS: 0
SHORTNESS OF BREATH: 0
CHILLS: 0
ACTIVITY CHANGE: 1
WEAKNESS: 1
DIARRHEA: 0
SINUS PRESSURE: 0
COUGH: 0
HEADACHES: 0
LIGHT-HEADEDNESS: 0
BACK PAIN: 0
DIZZINESS: 0
FEVER: 0
COLOR CHANGE: 0
AGITATION: 0

## 2022-09-21 ASSESSMENT — PAIN SCALES - GENERAL: PAINLEVEL: 0

## 2022-09-26 ENCOUNTER — NURSING HOME VISIT (OUTPATIENT)
Dept: POST ACUTE CARE | Age: 82
End: 2022-09-26

## 2022-09-26 VITALS
HEART RATE: 61 BPM | TEMPERATURE: 97.7 F | RESPIRATION RATE: 16 BRPM | BODY MASS INDEX: 27.88 KG/M2 | WEIGHT: 162.4 LBS | DIASTOLIC BLOOD PRESSURE: 62 MMHG | OXYGEN SATURATION: 98 % | SYSTOLIC BLOOD PRESSURE: 136 MMHG

## 2022-09-26 DIAGNOSIS — I63.9 ACUTE CVA (CEREBROVASCULAR ACCIDENT) (CMD): Primary | ICD-10-CM

## 2022-09-26 DIAGNOSIS — N18.32 STAGE 3B CHRONIC KIDNEY DISEASE (CMD): ICD-10-CM

## 2022-09-26 DIAGNOSIS — I10 PRIMARY HYPERTENSION: ICD-10-CM

## 2022-09-26 DIAGNOSIS — R41.0 DELIRIUM: ICD-10-CM

## 2022-09-26 DIAGNOSIS — G20.A1 PARKINSON'S DISEASE: ICD-10-CM

## 2022-09-26 DIAGNOSIS — R53.81 DEBILITY: ICD-10-CM

## 2022-09-26 PROCEDURE — 99308 SBSQ NF CARE LOW MDM 20: CPT

## 2022-09-26 PROCEDURE — 1160F RVW MEDS BY RX/DR IN RCRD: CPT

## 2022-09-26 PROCEDURE — 1126F AMNT PAIN NOTED NONE PRSNT: CPT

## 2022-09-26 ASSESSMENT — ENCOUNTER SYMPTOMS
FEVER: 0
DIZZINESS: 0
ACTIVITY CHANGE: 1
DIARRHEA: 0
ABDOMINAL PAIN: 0
CHILLS: 0
VOMITING: 0
SORE THROAT: 0
COUGH: 0
SHORTNESS OF BREATH: 0
SLEEP DISTURBANCE: 0
WEAKNESS: 1
LIGHT-HEADEDNESS: 0
CONSTIPATION: 0
SINUS PRESSURE: 0
NAUSEA: 0
FATIGUE: 0
BACK PAIN: 0
CHEST TIGHTNESS: 0
AGITATION: 0
NERVOUS/ANXIOUS: 0
HEADACHES: 0
CONFUSION: 0
SINUS PAIN: 0
COLOR CHANGE: 0
RHINORRHEA: 0

## 2022-09-26 ASSESSMENT — PAIN SCALES - GENERAL: PAINLEVEL: 0

## 2022-09-28 ENCOUNTER — NURSING HOME VISIT (OUTPATIENT)
Dept: POST ACUTE CARE | Age: 82
End: 2022-09-28

## 2022-09-28 VITALS
SYSTOLIC BLOOD PRESSURE: 129 MMHG | BODY MASS INDEX: 28.67 KG/M2 | TEMPERATURE: 97.2 F | OXYGEN SATURATION: 98 % | DIASTOLIC BLOOD PRESSURE: 75 MMHG | RESPIRATION RATE: 16 BRPM | HEART RATE: 70 BPM | WEIGHT: 167 LBS

## 2022-09-28 DIAGNOSIS — I10 PRIMARY HYPERTENSION: ICD-10-CM

## 2022-09-28 DIAGNOSIS — N40.1 BENIGN PROSTATIC HYPERPLASIA WITH URINARY OBSTRUCTION: ICD-10-CM

## 2022-09-28 DIAGNOSIS — R41.0 DELIRIUM: ICD-10-CM

## 2022-09-28 DIAGNOSIS — N18.31 STAGE 3A CHRONIC KIDNEY DISEASE (CMD): ICD-10-CM

## 2022-09-28 DIAGNOSIS — R53.81 DEBILITY: ICD-10-CM

## 2022-09-28 DIAGNOSIS — M81.0 OSTEOPOROSIS, UNSPECIFIED OSTEOPOROSIS TYPE, UNSPECIFIED PATHOLOGICAL FRACTURE PRESENCE: ICD-10-CM

## 2022-09-28 DIAGNOSIS — G20.A1 PARKINSON'S DISEASE: ICD-10-CM

## 2022-09-28 DIAGNOSIS — I63.9 ACUTE CVA (CEREBROVASCULAR ACCIDENT) (CMD): Primary | ICD-10-CM

## 2022-09-28 DIAGNOSIS — N13.8 BENIGN PROSTATIC HYPERPLASIA WITH URINARY OBSTRUCTION: ICD-10-CM

## 2022-09-28 PROCEDURE — 1160F RVW MEDS BY RX/DR IN RCRD: CPT

## 2022-09-28 PROCEDURE — 99316 NF DSCHRG MGMT 30 MIN+: CPT

## 2022-09-28 PROCEDURE — 99497 ADVNCD CARE PLAN 30 MIN: CPT

## 2022-09-28 PROCEDURE — 1170F FXNL STATUS ASSESSED: CPT

## 2022-09-28 PROCEDURE — 1126F AMNT PAIN NOTED NONE PRSNT: CPT

## 2022-09-28 ASSESSMENT — ENCOUNTER SYMPTOMS
ACTIVITY CHANGE: 1
SINUS PRESSURE: 0
DIARRHEA: 0
COUGH: 0
NAUSEA: 0
CONSTIPATION: 0
FATIGUE: 0
NERVOUS/ANXIOUS: 0
VOMITING: 0
WEAKNESS: 1
SHORTNESS OF BREATH: 0
CHILLS: 0
AGITATION: 0
CHEST TIGHTNESS: 0
LIGHT-HEADEDNESS: 0
BACK PAIN: 0
SLEEP DISTURBANCE: 0
DIZZINESS: 0
ABDOMINAL PAIN: 0
SINUS PAIN: 0
CONFUSION: 0
FEVER: 0
HEADACHES: 0
RHINORRHEA: 0
SORE THROAT: 0
COLOR CHANGE: 0

## 2022-09-28 ASSESSMENT — PAIN SCALES - GENERAL: PAINLEVEL: 0

## 2022-09-30 ENCOUNTER — TELEPHONE (OUTPATIENT)
Dept: INTERNAL MEDICINE | Age: 82
End: 2022-09-30

## 2022-10-07 ENCOUNTER — APPOINTMENT (OUTPATIENT)
Dept: INTERNAL MEDICINE | Age: 82
End: 2022-10-07

## 2022-10-08 ENCOUNTER — TELEPHONE (OUTPATIENT)
Dept: INTERNAL MEDICINE | Age: 82
End: 2022-10-08

## 2022-10-09 PROBLEM — N18.32 CHRONIC KIDNEY DISEASE, STAGE 3B (CMD): Status: ACTIVE | Noted: 2022-09-19

## 2022-10-09 PROBLEM — F09 COGNITIVE DYSFUNCTION: Status: ACTIVE | Noted: 2017-05-08

## 2022-10-09 PROBLEM — R13.11 DYSPHAGIA, ORAL PHASE: Status: ACTIVE | Noted: 2022-09-03

## 2022-10-09 PROBLEM — H53.2 DIPLOPIA: Status: ACTIVE | Noted: 2022-09-03

## 2022-10-09 PROBLEM — R41.841 COGNITIVE COMMUNICATION DEFICIT: Status: ACTIVE | Noted: 2022-09-03

## 2022-10-09 PROBLEM — I63.9 CEREBRAL INFARCTION, UNSPECIFIED (CMD): Status: ACTIVE | Noted: 2022-09-03

## 2022-10-09 PROBLEM — N13.8 OTHER OBSTRUCTIVE AND REFLUX UROPATHY: Status: ACTIVE | Noted: 2022-09-15

## 2022-10-09 PROBLEM — E78.5 HYPERLIPIDEMIA, UNSPECIFIED: Status: ACTIVE | Noted: 2022-09-03

## 2022-10-09 PROBLEM — N17.9 ACUTE KIDNEY FAILURE, UNSPECIFIED (CMD): Status: ACTIVE | Noted: 2022-09-03

## 2022-10-09 PROBLEM — R47.1 DYSARTHRIA AND ANARTHRIA: Status: ACTIVE | Noted: 2022-09-03

## 2022-10-09 PROBLEM — I13.0 HYPERTENSIVE HEART AND CHRONIC KIDNEY DISEASE WITH HEART FAILURE AND STAGE 1 THROUGH STAGE 4 CHRONIC KIDNEY DISEASE, OR UNSPECIFIED CHRONIC KIDNEY DISEASE  (CMD): Status: ACTIVE | Noted: 2022-10-09

## 2022-10-11 ENCOUNTER — OFFICE VISIT (OUTPATIENT)
Dept: INTERNAL MEDICINE | Age: 82
End: 2022-10-11

## 2022-10-11 VITALS
HEIGHT: 64 IN | SYSTOLIC BLOOD PRESSURE: 148 MMHG | DIASTOLIC BLOOD PRESSURE: 92 MMHG | TEMPERATURE: 97.7 F | OXYGEN SATURATION: 98 % | HEART RATE: 70 BPM | BODY MASS INDEX: 28.67 KG/M2

## 2022-10-11 DIAGNOSIS — I63.9 OCCIPITAL CEREBRAL INFARCTION (CMD): Primary | ICD-10-CM

## 2022-10-11 DIAGNOSIS — I10 PRIMARY HYPERTENSION: ICD-10-CM

## 2022-10-11 DIAGNOSIS — E78.49 OTHER HYPERLIPIDEMIA: ICD-10-CM

## 2022-10-11 DIAGNOSIS — G20.A1 PARKINSON'S DISEASE: ICD-10-CM

## 2022-10-11 DIAGNOSIS — R33.8 BENIGN PROSTATIC HYPERPLASIA WITH URINARY RETENTION: ICD-10-CM

## 2022-10-11 DIAGNOSIS — N40.1 BENIGN PROSTATIC HYPERPLASIA WITH URINARY RETENTION: ICD-10-CM

## 2022-10-11 PROCEDURE — 99215 OFFICE O/P EST HI 40 MIN: CPT | Performed by: INTERNAL MEDICINE

## 2022-10-11 PROCEDURE — 3080F DIAST BP >= 90 MM HG: CPT | Performed by: INTERNAL MEDICINE

## 2022-10-11 PROCEDURE — 3077F SYST BP >= 140 MM HG: CPT | Performed by: INTERNAL MEDICINE

## 2022-10-11 RX ORDER — ALENDRONATE SODIUM 70 MG/1
TABLET ORAL
COMMUNITY
Start: 2022-09-28 | End: 2022-10-11 | Stop reason: ALTCHOICE

## 2022-10-11 RX ORDER — CARVEDILOL 6.25 MG/1
6.25 TABLET ORAL 2 TIMES DAILY WITH MEALS
Qty: 60 TABLET | Refills: 5 | Status: SHIPPED | OUTPATIENT
Start: 2022-10-11 | End: 2022-10-24 | Stop reason: SDUPTHER

## 2022-10-24 ENCOUNTER — OFFICE VISIT (OUTPATIENT)
Dept: INTERNAL MEDICINE | Age: 82
End: 2022-10-24

## 2022-10-24 ENCOUNTER — LAB SERVICES (OUTPATIENT)
Dept: LAB | Age: 82
End: 2022-10-24

## 2022-10-24 VITALS
TEMPERATURE: 98.1 F | HEART RATE: 62 BPM | DIASTOLIC BLOOD PRESSURE: 62 MMHG | OXYGEN SATURATION: 95 % | BODY MASS INDEX: 28.67 KG/M2 | SYSTOLIC BLOOD PRESSURE: 130 MMHG | HEIGHT: 64 IN

## 2022-10-24 DIAGNOSIS — M81.0 OSTEOPOROSIS, UNSPECIFIED OSTEOPOROSIS TYPE, UNSPECIFIED PATHOLOGICAL FRACTURE PRESENCE: ICD-10-CM

## 2022-10-24 DIAGNOSIS — I10 PRIMARY HYPERTENSION: ICD-10-CM

## 2022-10-24 DIAGNOSIS — I10 PRIMARY HYPERTENSION: Primary | ICD-10-CM

## 2022-10-24 DIAGNOSIS — N18.30 STAGE 3 CHRONIC KIDNEY DISEASE, UNSPECIFIED WHETHER STAGE 3A OR 3B CKD (CMD): ICD-10-CM

## 2022-10-24 DIAGNOSIS — I63.9 ACUTE CVA (CEREBROVASCULAR ACCIDENT) (CMD): ICD-10-CM

## 2022-10-24 DIAGNOSIS — E78.49 OTHER HYPERLIPIDEMIA: ICD-10-CM

## 2022-10-24 LAB
25(OH)D3 SERPL-MCNC: 34.6 NG/ML (ref 30–100)
ALBUMIN SERPL-MCNC: 4.8 G/DL (ref 3.6–5.1)
ALP SERPL-CCNC: 66 U/L (ref 45–115)
ALT SERPL W/O P-5'-P-CCNC: 10 U/L (ref 5–49)
AST SERPL-CCNC: 30 U/L (ref 14–43)
BASOPHIL %: 0.6 % (ref 0–1.2)
BASOPHIL ABSOLUTE #: 0.1 10*3/UL (ref 0–0.1)
BILIRUB CONJ SERPL-MCNC: 0 MG/DL (ref 0–0.3)
BILIRUB SERPL-MCNC: 0.9 MG/DL (ref 0–1.3)
BUN SERPL-MCNC: 24 MG/DL (ref 6–27)
CALCIUM SERPL-MCNC: 9.7 MG/DL (ref 8.6–10.6)
CHLORIDE SERPL-SCNC: 101 MMOL/L (ref 96–107)
CO2 SERPL-SCNC: 25 MMOL/L (ref 22–32)
CREAT SERPL-MCNC: 1.7 MG/DL (ref 0.6–1.6)
DIFFERENTIAL TYPE: ABNORMAL
EOSINOPHIL %: 2.7 % (ref 0–10)
EOSINOPHIL ABSOLUTE #: 0.3 10*3/UL (ref 0–0.5)
GFR SERPL CREATININE-BSD FRML MDRD: 39 ML/MIN/{1.73M2}
GFR SERPL CREATININE-BSD FRML MDRD: 47 ML/MIN/{1.73M2}
GLUCOSE SERPL-MCNC: 86 MG/DL (ref 70–200)
HEMATOCRIT: 45.9 % (ref 40–51)
HEMOGLOBIN: 15.8 G/DL (ref 13.7–17.5)
IMMATURE GRANULOCYTE ABSOLUTE: 0.04 10*3/UL (ref 0–0.05)
IMMATURE GRANULOCYTE PERCENT: 0.4 % (ref 0–0.5)
LYMPH PERCENT: 20.4 % (ref 20.5–51.1)
LYMPHOCYTE ABSOLUTE #: 1.9 10*3/UL (ref 1.2–3.4)
MEAN CORPUSCULAR HGB CONCENTRATION: 34.4 % (ref 32–36)
MEAN CORPUSCULAR HGB: 32.5 PG (ref 27–34)
MEAN CORPUSCULAR VOLUME: 94.4 FL (ref 79–95)
MEAN PLATELET VOLUME: 9.6 FL (ref 8.6–12.4)
MONOCYTE ABSOLUTE #: 0.9 10*3/UL (ref 0.2–0.9)
MONOCYTE PERCENT: 9.8 % (ref 4.3–12.9)
NEUTROPHIL ABSOLUTE #: 6.2 10*3/UL (ref 1.4–6.5)
NEUTROPHIL PERCENT: 66.1 % (ref 34–73.5)
PLATELET COUNT: 203 10*3/UL (ref 150–400)
POTASSIUM SERPL-SCNC: 4.4 MMOL/L (ref 3.5–5.3)
PROT SERPL-MCNC: 7.1 G/DL (ref 6.4–8.5)
RED BLOOD CELL COUNT: 4.86 10*6/UL (ref 3.9–5.7)
RED CELL DISTRIBUTION WIDTH: 13.6 % (ref 11.3–14.8)
SODIUM SERPL-SCNC: 141 MMOL/L (ref 136–146)
TSH SERPL DL<=0.05 MIU/L-ACNC: 0.76 M[IU]/L (ref 0.3–4.82)
WHITE BLOOD CELL COUNT: 9.3 10*3/UL (ref 4–10)

## 2022-10-24 PROCEDURE — 80048 BASIC METABOLIC PNL TOTAL CA: CPT | Performed by: INTERNAL MEDICINE

## 2022-10-24 PROCEDURE — 36415 COLL VENOUS BLD VENIPUNCTURE: CPT | Performed by: INTERNAL MEDICINE

## 2022-10-24 PROCEDURE — 99213 OFFICE O/P EST LOW 20 MIN: CPT | Performed by: INTERNAL MEDICINE

## 2022-10-24 PROCEDURE — 3075F SYST BP GE 130 - 139MM HG: CPT | Performed by: INTERNAL MEDICINE

## 2022-10-24 PROCEDURE — 82306 VITAMIN D 25 HYDROXY: CPT | Performed by: INTERNAL MEDICINE

## 2022-10-24 PROCEDURE — 3078F DIAST BP <80 MM HG: CPT | Performed by: INTERNAL MEDICINE

## 2022-10-24 PROCEDURE — 80076 HEPATIC FUNCTION PANEL: CPT | Performed by: INTERNAL MEDICINE

## 2022-10-24 PROCEDURE — 85025 COMPLETE CBC W/AUTO DIFF WBC: CPT | Performed by: INTERNAL MEDICINE

## 2022-10-24 PROCEDURE — 84443 ASSAY THYROID STIM HORMONE: CPT | Performed by: INTERNAL MEDICINE

## 2022-10-24 RX ORDER — CARVEDILOL 6.25 MG/1
6.25-12.5 TABLET ORAL 2 TIMES DAILY WITH MEALS
Qty: 60 TABLET | Refills: 5 | Status: SHIPPED | COMMUNITY
Start: 2022-10-24 | End: 2023-11-14

## 2022-10-25 ENCOUNTER — TELEPHONE (OUTPATIENT)
Dept: INTERNAL MEDICINE | Age: 82
End: 2022-10-25

## 2022-10-25 DIAGNOSIS — G20.A1 PARKINSON'S DISEASE: Primary | ICD-10-CM

## 2022-10-25 DIAGNOSIS — I63.9 ACUTE CVA (CEREBROVASCULAR ACCIDENT) (CMD): Primary | ICD-10-CM

## 2022-10-25 DIAGNOSIS — R26.9 ABNORMAL GAIT: ICD-10-CM

## 2022-10-25 DIAGNOSIS — R13.11 DYSPHAGIA, ORAL PHASE: ICD-10-CM

## 2022-10-26 ENCOUNTER — OFFICE VISIT (OUTPATIENT)
Dept: CARDIOLOGY | Age: 82
End: 2022-10-26

## 2022-10-26 VITALS
RESPIRATION RATE: 18 BRPM | HEIGHT: 65 IN | HEART RATE: 62 BPM | DIASTOLIC BLOOD PRESSURE: 44 MMHG | OXYGEN SATURATION: 98 % | WEIGHT: 165 LBS | SYSTOLIC BLOOD PRESSURE: 90 MMHG | BODY MASS INDEX: 27.49 KG/M2

## 2022-10-26 DIAGNOSIS — I95.9 HYPOTENSION, UNSPECIFIED HYPOTENSION TYPE: ICD-10-CM

## 2022-10-26 DIAGNOSIS — I51.89 DIASTOLIC DYSFUNCTION: ICD-10-CM

## 2022-10-26 DIAGNOSIS — I10 SUPINE HYPERTENSION: Primary | ICD-10-CM

## 2022-10-26 DIAGNOSIS — R93.1 AGATSTON CORONARY ARTERY CALCIUM SCORE GREATER THAN 400: ICD-10-CM

## 2022-10-26 DIAGNOSIS — G20.A1 PARKINSON'S DISEASE: ICD-10-CM

## 2022-10-26 DIAGNOSIS — N18.32 CHRONIC RENAL FAILURE, STAGE 3B (CMD): ICD-10-CM

## 2022-10-26 DIAGNOSIS — E78.2 MIXED HYPERLIPIDEMIA: ICD-10-CM

## 2022-10-26 DIAGNOSIS — I69.30 HISTORY OF STROKE WITH RESIDUAL EFFECTS: ICD-10-CM

## 2022-10-26 PROCEDURE — 99205 OFFICE O/P NEW HI 60 MIN: CPT | Performed by: INTERNAL MEDICINE

## 2022-10-26 PROCEDURE — 3078F DIAST BP <80 MM HG: CPT | Performed by: INTERNAL MEDICINE

## 2022-10-26 PROCEDURE — 3074F SYST BP LT 130 MM HG: CPT | Performed by: INTERNAL MEDICINE

## 2022-10-26 RX ORDER — HYDRALAZINE HYDROCHLORIDE 10 MG/1
5 TABLET, FILM COATED ORAL NIGHTLY
Qty: 30 TABLET | Refills: 6 | Status: SHIPPED | OUTPATIENT
Start: 2022-10-26 | End: 2022-11-07 | Stop reason: DRUGHIGH

## 2022-10-26 ASSESSMENT — PATIENT HEALTH QUESTIONNAIRE - PHQ9
1. LITTLE INTEREST OR PLEASURE IN DOING THINGS: NOT AT ALL
SUM OF ALL RESPONSES TO PHQ9 QUESTIONS 1 AND 2: 0
SUM OF ALL RESPONSES TO PHQ9 QUESTIONS 1 AND 2: 0
CLINICAL INTERPRETATION OF PHQ2 SCORE: NO FURTHER SCREENING NEEDED
2. FEELING DOWN, DEPRESSED OR HOPELESS: NOT AT ALL

## 2022-11-04 ENCOUNTER — E-ADVICE (OUTPATIENT)
Dept: CARDIOLOGY | Age: 82
End: 2022-11-04

## 2022-11-04 DIAGNOSIS — I51.89 DIASTOLIC DYSFUNCTION: ICD-10-CM

## 2022-11-04 DIAGNOSIS — I10 SUPINE HYPERTENSION: ICD-10-CM

## 2022-11-07 DIAGNOSIS — I51.89 DIASTOLIC DYSFUNCTION: ICD-10-CM

## 2022-11-07 DIAGNOSIS — I10 SUPINE HYPERTENSION: ICD-10-CM

## 2022-11-07 RX ORDER — CARVEDILOL 3.12 MG/1
TABLET ORAL
Qty: 180 TABLET | OUTPATIENT
Start: 2022-11-07

## 2022-11-07 RX ORDER — HYDRALAZINE HYDROCHLORIDE 10 MG/1
10 TABLET, FILM COATED ORAL NIGHTLY
Qty: 30 TABLET | Refills: 1 | Status: SHIPPED | OUTPATIENT
Start: 2022-11-07 | End: 2022-11-07

## 2022-11-07 RX ORDER — HYDRALAZINE HYDROCHLORIDE 10 MG/1
TABLET, FILM COATED ORAL
Qty: 90 TABLET | Refills: 1 | Status: SHIPPED | OUTPATIENT
Start: 2022-11-07 | End: 2023-05-26

## 2022-11-09 ENCOUNTER — TELEPHONE (OUTPATIENT)
Dept: INTERNAL MEDICINE | Age: 82
End: 2022-11-09

## 2022-11-16 RX ORDER — RISEDRONATE SODIUM 35 MG/1
35 TABLET, FILM COATED ORAL
Qty: 4 TABLET | Refills: 11 | Status: SHIPPED | OUTPATIENT
Start: 2022-11-16 | End: 2023-10-09

## 2022-11-22 ENCOUNTER — TELEPHONE (OUTPATIENT)
Dept: INTERNAL MEDICINE | Age: 82
End: 2022-11-22

## 2022-11-22 DIAGNOSIS — I63.9 ACUTE CVA (CEREBROVASCULAR ACCIDENT) (CMD): Primary | ICD-10-CM

## 2022-11-22 DIAGNOSIS — G20.A1 PARKINSON'S DISEASE: ICD-10-CM

## 2022-11-30 ENCOUNTER — OFFICE VISIT (OUTPATIENT)
Dept: CARDIOLOGY | Age: 82
End: 2022-11-30

## 2022-11-30 VITALS
HEART RATE: 65 BPM | HEIGHT: 65 IN | SYSTOLIC BLOOD PRESSURE: 136 MMHG | OXYGEN SATURATION: 97 % | RESPIRATION RATE: 18 BRPM | WEIGHT: 167 LBS | BODY MASS INDEX: 27.82 KG/M2 | DIASTOLIC BLOOD PRESSURE: 82 MMHG

## 2022-11-30 DIAGNOSIS — I69.30 HISTORY OF STROKE WITH RESIDUAL EFFECTS: ICD-10-CM

## 2022-11-30 DIAGNOSIS — E78.2 MIXED HYPERLIPIDEMIA: ICD-10-CM

## 2022-11-30 DIAGNOSIS — G20.A1 PARKINSON'S DISEASE: ICD-10-CM

## 2022-11-30 DIAGNOSIS — I95.9 HYPOTENSION, UNSPECIFIED HYPOTENSION TYPE: ICD-10-CM

## 2022-11-30 DIAGNOSIS — N18.32 CHRONIC KIDNEY DISEASE, STAGE 3B (CMD): ICD-10-CM

## 2022-11-30 DIAGNOSIS — R93.1 AGATSTON CORONARY ARTERY CALCIUM SCORE GREATER THAN 400: ICD-10-CM

## 2022-11-30 DIAGNOSIS — I51.89 DIASTOLIC DYSFUNCTION: ICD-10-CM

## 2022-11-30 DIAGNOSIS — I10 SUPINE HYPERTENSION: Primary | ICD-10-CM

## 2022-11-30 PROCEDURE — 99215 OFFICE O/P EST HI 40 MIN: CPT | Performed by: INTERNAL MEDICINE

## 2022-11-30 PROCEDURE — 3075F SYST BP GE 130 - 139MM HG: CPT | Performed by: INTERNAL MEDICINE

## 2022-11-30 PROCEDURE — 3079F DIAST BP 80-89 MM HG: CPT | Performed by: INTERNAL MEDICINE

## 2022-11-30 RX ORDER — CARVEDILOL 3.12 MG/1
3.12 TABLET ORAL
Qty: 100 TABLET | Refills: 1 | Status: SHIPPED | OUTPATIENT
Start: 2022-11-30 | End: 2023-06-19

## 2022-11-30 ASSESSMENT — PATIENT HEALTH QUESTIONNAIRE - PHQ9
SUM OF ALL RESPONSES TO PHQ9 QUESTIONS 1 AND 2: 0
CLINICAL INTERPRETATION OF PHQ2 SCORE: NO FURTHER SCREENING NEEDED
1. LITTLE INTEREST OR PLEASURE IN DOING THINGS: NOT AT ALL
2. FEELING DOWN, DEPRESSED OR HOPELESS: NOT AT ALL
SUM OF ALL RESPONSES TO PHQ9 QUESTIONS 1 AND 2: 0

## 2022-12-09 ENCOUNTER — TELEPHONE (OUTPATIENT)
Dept: INTERNAL MEDICINE | Age: 82
End: 2022-12-09

## 2022-12-09 DIAGNOSIS — I63.9 ACUTE CVA (CEREBROVASCULAR ACCIDENT) (CMD): ICD-10-CM

## 2022-12-09 DIAGNOSIS — G20.A1 PARKINSON'S DISEASE: Primary | ICD-10-CM

## 2023-01-19 ENCOUNTER — OFFICE VISIT (OUTPATIENT)
Dept: INTERNAL MEDICINE | Age: 83
End: 2023-01-19

## 2023-01-19 VITALS
OXYGEN SATURATION: 95 % | BODY MASS INDEX: 27.79 KG/M2 | SYSTOLIC BLOOD PRESSURE: 132 MMHG | HEIGHT: 65 IN | TEMPERATURE: 98 F | DIASTOLIC BLOOD PRESSURE: 76 MMHG | HEART RATE: 64 BPM

## 2023-01-19 DIAGNOSIS — I10 PRIMARY HYPERTENSION: ICD-10-CM

## 2023-01-19 DIAGNOSIS — I51.89 DIASTOLIC DYSFUNCTION: Primary | ICD-10-CM

## 2023-01-19 DIAGNOSIS — I11.0 HYPERTENSIVE HEART DISEASE WITH HEART FAILURE (CMD): ICD-10-CM

## 2023-01-19 DIAGNOSIS — R33.8 BENIGN PROSTATIC HYPERPLASIA WITH URINARY RETENTION: ICD-10-CM

## 2023-01-19 DIAGNOSIS — E78.49 OTHER HYPERLIPIDEMIA: ICD-10-CM

## 2023-01-19 DIAGNOSIS — N40.1 BENIGN PROSTATIC HYPERPLASIA WITH URINARY RETENTION: ICD-10-CM

## 2023-01-19 PROCEDURE — 99214 OFFICE O/P EST MOD 30 MIN: CPT | Performed by: INTERNAL MEDICINE

## 2023-01-19 PROCEDURE — 3075F SYST BP GE 130 - 139MM HG: CPT | Performed by: INTERNAL MEDICINE

## 2023-01-19 PROCEDURE — 3078F DIAST BP <80 MM HG: CPT | Performed by: INTERNAL MEDICINE

## 2023-01-19 RX ORDER — ASPIRIN 325 MG
325 TABLET, DELAYED RELEASE (ENTERIC COATED) ORAL DAILY
COMMUNITY

## 2023-03-03 ENCOUNTER — OFFICE VISIT (OUTPATIENT)
Dept: CARDIOLOGY | Age: 83
End: 2023-03-03

## 2023-03-03 ENCOUNTER — APPOINTMENT (OUTPATIENT)
Dept: CARDIOLOGY | Age: 83
End: 2023-03-03

## 2023-03-03 VITALS
DIASTOLIC BLOOD PRESSURE: 66 MMHG | RESPIRATION RATE: 18 BRPM | WEIGHT: 168 LBS | OXYGEN SATURATION: 97 % | SYSTOLIC BLOOD PRESSURE: 118 MMHG | HEART RATE: 64 BPM | HEIGHT: 65 IN | BODY MASS INDEX: 27.99 KG/M2

## 2023-03-03 DIAGNOSIS — E78.2 MIXED HYPERLIPIDEMIA: ICD-10-CM

## 2023-03-03 DIAGNOSIS — I10 SUPINE HYPERTENSION: ICD-10-CM

## 2023-03-03 DIAGNOSIS — I69.30 HISTORY OF STROKE WITH RESIDUAL EFFECTS: ICD-10-CM

## 2023-03-03 DIAGNOSIS — R93.1 AGATSTON CORONARY ARTERY CALCIUM SCORE GREATER THAN 400: Primary | ICD-10-CM

## 2023-03-03 DIAGNOSIS — I13.0 HYPERTENSIVE HEART AND CHRONIC KIDNEY DISEASE WITH HEART FAILURE AND STAGE 1 THROUGH STAGE 4 CHRONIC KIDNEY DISEASE, OR UNSPECIFIED CHRONIC KIDNEY DISEASE (CMD): ICD-10-CM

## 2023-03-03 DIAGNOSIS — N18.32 CHRONIC KIDNEY DISEASE, STAGE 3B (CMD): ICD-10-CM

## 2023-03-03 DIAGNOSIS — G20.A1 PARKINSON'S DISEASE: ICD-10-CM

## 2023-03-03 PROCEDURE — 3074F SYST BP LT 130 MM HG: CPT | Performed by: INTERNAL MEDICINE

## 2023-03-03 PROCEDURE — 93000 ELECTROCARDIOGRAM COMPLETE: CPT | Performed by: INTERNAL MEDICINE

## 2023-03-03 PROCEDURE — 3078F DIAST BP <80 MM HG: CPT | Performed by: INTERNAL MEDICINE

## 2023-03-03 PROCEDURE — 99214 OFFICE O/P EST MOD 30 MIN: CPT | Performed by: INTERNAL MEDICINE

## 2023-03-03 RX ORDER — LEVODOPA AND CARBIDOPA 195; 48.75 MG/1; MG/1
1 CAPSULE, EXTENDED RELEASE ORAL 2 TIMES DAILY
COMMUNITY

## 2023-03-03 ASSESSMENT — PATIENT HEALTH QUESTIONNAIRE - PHQ9
SUM OF ALL RESPONSES TO PHQ9 QUESTIONS 1 AND 2: 0
1. LITTLE INTEREST OR PLEASURE IN DOING THINGS: NOT AT ALL
SUM OF ALL RESPONSES TO PHQ9 QUESTIONS 1 AND 2: 0
CLINICAL INTERPRETATION OF PHQ2 SCORE: NO FURTHER SCREENING NEEDED
2. FEELING DOWN, DEPRESSED OR HOPELESS: NOT AT ALL

## 2023-04-20 ENCOUNTER — TELEPHONE (OUTPATIENT)
Dept: INTERNAL MEDICINE | Age: 83
End: 2023-04-20

## 2023-04-21 ENCOUNTER — LAB SERVICES (OUTPATIENT)
Dept: LAB | Age: 83
End: 2023-04-21

## 2023-04-21 ENCOUNTER — APPOINTMENT (OUTPATIENT)
Dept: GENERAL RADIOLOGY | Age: 83
End: 2023-04-21

## 2023-04-21 DIAGNOSIS — E78.49 OTHER HYPERLIPIDEMIA: ICD-10-CM

## 2023-04-21 DIAGNOSIS — I10 PRIMARY HYPERTENSION: ICD-10-CM

## 2023-04-21 LAB
ALBUMIN SERPL-MCNC: 4.2 G/DL (ref 3.6–5.1)
ALP SERPL-CCNC: 68 UNITS/L (ref 45–117)
ALT SERPL-CCNC: 11 UNITS/L
ANION GAP SERPL CALC-SCNC: 17 MMOL/L (ref 7–19)
AST SERPL-CCNC: 23 UNITS/L
BASOPHILS # BLD: 0.1 K/MCL (ref 0–0.3)
BASOPHILS NFR BLD: 1 %
BILIRUB CONJ SERPL-MCNC: 0.2 MG/DL (ref 0–0.2)
BILIRUB SERPL-MCNC: 0.7 MG/DL (ref 0.2–1)
BUN SERPL-MCNC: 24 MG/DL (ref 6–20)
BUN/CREAT SERPL: 15 (ref 7–25)
CALCIUM SERPL-MCNC: 9.2 MG/DL (ref 8.4–10.2)
CHLORIDE SERPL-SCNC: 103 MMOL/L (ref 97–110)
CHOLEST SERPL-MCNC: 135 MG/DL
CHOLEST/HDLC SERPL: 3.6 {RATIO}
CO2 SERPL-SCNC: 26 MMOL/L (ref 21–32)
CREAT SERPL-MCNC: 1.56 MG/DL (ref 0.67–1.17)
DEPRECATED RDW RBC: 45.9 FL (ref 39–50)
EOSINOPHIL # BLD: 0.3 K/MCL (ref 0–0.5)
EOSINOPHIL NFR BLD: 4 %
ERYTHROCYTE [DISTWIDTH] IN BLOOD: 13.2 % (ref 11–15)
FASTING DURATION TIME PATIENT: ABNORMAL H
FASTING DURATION TIME PATIENT: ABNORMAL H
GFR SERPLBLD BASED ON 1.73 SQ M-ARVRAT: 44 ML/MIN
GLUCOSE SERPL-MCNC: 100 MG/DL (ref 70–99)
HCT VFR BLD CALC: 48.1 % (ref 39–51)
HDLC SERPL-MCNC: 37 MG/DL
HGB BLD-MCNC: 16.1 G/DL (ref 13–17)
IMM GRANULOCYTES # BLD AUTO: 0 K/MCL (ref 0–0.2)
IMM GRANULOCYTES # BLD: 0 %
LDLC SERPL CALC-MCNC: 67 MG/DL
LYMPHOCYTES # BLD: 2.7 K/MCL (ref 1–4)
LYMPHOCYTES NFR BLD: 30 %
MCH RBC QN AUTO: 32 PG (ref 26–34)
MCHC RBC AUTO-ENTMCNC: 33.5 G/DL (ref 32–36.5)
MCV RBC AUTO: 95.6 FL (ref 78–100)
MONOCYTES # BLD: 0.9 K/MCL (ref 0.3–0.9)
MONOCYTES NFR BLD: 10 %
NEUTROPHILS # BLD: 4.9 K/MCL (ref 1.8–7.7)
NEUTROPHILS NFR BLD: 55 %
NONHDLC SERPL-MCNC: 98 MG/DL
NRBC BLD MANUAL-RTO: 0 /100 WBC
PLATELET # BLD AUTO: 206 K/MCL (ref 140–450)
POTASSIUM SERPL-SCNC: 4.2 MMOL/L (ref 3.4–5.1)
PROT SERPL-MCNC: 7 G/DL (ref 6.4–8.2)
RBC # BLD: 5.03 MIL/MCL (ref 4.5–5.9)
SODIUM SERPL-SCNC: 142 MMOL/L (ref 135–145)
TRIGL SERPL-MCNC: 154 MG/DL
TSH SERPL-ACNC: 1.03 MCUNITS/ML (ref 0.35–5)
WBC # BLD: 8.9 K/MCL (ref 4.2–11)

## 2023-04-21 PROCEDURE — 80061 LIPID PANEL: CPT | Performed by: INTERNAL MEDICINE

## 2023-04-21 PROCEDURE — 80076 HEPATIC FUNCTION PANEL: CPT | Performed by: INTERNAL MEDICINE

## 2023-04-21 PROCEDURE — 36415 COLL VENOUS BLD VENIPUNCTURE: CPT | Performed by: INTERNAL MEDICINE

## 2023-04-21 PROCEDURE — 80048 BASIC METABOLIC PNL TOTAL CA: CPT | Performed by: INTERNAL MEDICINE

## 2023-04-21 PROCEDURE — 85025 COMPLETE CBC W/AUTO DIFF WBC: CPT | Performed by: INTERNAL MEDICINE

## 2023-04-21 PROCEDURE — 84443 ASSAY THYROID STIM HORMONE: CPT | Performed by: INTERNAL MEDICINE

## 2023-04-24 ENCOUNTER — OFFICE VISIT (OUTPATIENT)
Dept: INTERNAL MEDICINE | Age: 83
End: 2023-04-24

## 2023-04-24 VITALS
TEMPERATURE: 97.5 F | SYSTOLIC BLOOD PRESSURE: 116 MMHG | BODY MASS INDEX: 27.96 KG/M2 | HEIGHT: 65 IN | DIASTOLIC BLOOD PRESSURE: 68 MMHG | HEART RATE: 60 BPM | OXYGEN SATURATION: 96 %

## 2023-04-24 DIAGNOSIS — N18.31 STAGE 3A CHRONIC KIDNEY DISEASE (CMD): ICD-10-CM

## 2023-04-24 DIAGNOSIS — E78.49 OTHER HYPERLIPIDEMIA: ICD-10-CM

## 2023-04-24 DIAGNOSIS — Z86.73 HISTORY OF ISCHEMIC STROKE: Primary | ICD-10-CM

## 2023-04-24 DIAGNOSIS — I51.89 DIASTOLIC DYSFUNCTION: ICD-10-CM

## 2023-04-24 PROCEDURE — 3078F DIAST BP <80 MM HG: CPT | Performed by: INTERNAL MEDICINE

## 2023-04-24 PROCEDURE — 99214 OFFICE O/P EST MOD 30 MIN: CPT | Performed by: INTERNAL MEDICINE

## 2023-04-24 PROCEDURE — 3074F SYST BP LT 130 MM HG: CPT | Performed by: INTERNAL MEDICINE

## 2023-04-24 RX ORDER — SODIUM FLUORIDE 6 MG/ML
PASTE, DENTIFRICE DENTAL
COMMUNITY
Start: 2023-04-21

## 2023-05-26 DIAGNOSIS — I10 SUPINE HYPERTENSION: ICD-10-CM

## 2023-05-26 DIAGNOSIS — I51.89 DIASTOLIC DYSFUNCTION: ICD-10-CM

## 2023-05-26 RX ORDER — HYDRALAZINE HYDROCHLORIDE 10 MG/1
TABLET, FILM COATED ORAL
Qty: 90 TABLET | Refills: 1 | Status: SHIPPED | OUTPATIENT
Start: 2023-05-26 | End: 2023-08-28

## 2023-06-19 DIAGNOSIS — I10 SUPINE HYPERTENSION: ICD-10-CM

## 2023-06-19 DIAGNOSIS — R93.1 AGATSTON CORONARY ARTERY CALCIUM SCORE GREATER THAN 400: ICD-10-CM

## 2023-06-19 RX ORDER — CARVEDILOL 3.12 MG/1
TABLET ORAL
Qty: 100 TABLET | Refills: 1 | Status: SHIPPED | OUTPATIENT
Start: 2023-06-19 | End: 2023-11-14 | Stop reason: ALTCHOICE

## 2023-06-28 RX ORDER — ATORVASTATIN CALCIUM 10 MG/1
10 TABLET, FILM COATED ORAL DAILY
Qty: 90 TABLET | Refills: 3 | Status: SHIPPED | OUTPATIENT
Start: 2023-06-28

## 2023-07-10 ENCOUNTER — OFFICE VISIT (OUTPATIENT)
Dept: CARDIOLOGY | Age: 83
End: 2023-07-10

## 2023-07-10 VITALS
RESPIRATION RATE: 18 BRPM | HEART RATE: 68 BPM | DIASTOLIC BLOOD PRESSURE: 82 MMHG | BODY MASS INDEX: 28.29 KG/M2 | SYSTOLIC BLOOD PRESSURE: 132 MMHG | OXYGEN SATURATION: 97 % | WEIGHT: 170 LBS

## 2023-07-10 DIAGNOSIS — I69.30 HISTORY OF STROKE WITH RESIDUAL EFFECTS: ICD-10-CM

## 2023-07-10 DIAGNOSIS — I13.0 HYPERTENSIVE HEART AND CHRONIC KIDNEY DISEASE WITH HEART FAILURE AND STAGE 1 THROUGH STAGE 4 CHRONIC KIDNEY DISEASE, OR UNSPECIFIED CHRONIC KIDNEY DISEASE (CMD): ICD-10-CM

## 2023-07-10 DIAGNOSIS — I10 SUPINE HYPERTENSION: ICD-10-CM

## 2023-07-10 DIAGNOSIS — R93.1 AGATSTON CORONARY ARTERY CALCIUM SCORE GREATER THAN 400: Primary | ICD-10-CM

## 2023-07-10 DIAGNOSIS — G20.A1 PARKINSON'S DISEASE: ICD-10-CM

## 2023-07-10 DIAGNOSIS — E78.2 MIXED HYPERLIPIDEMIA: ICD-10-CM

## 2023-07-10 DIAGNOSIS — N18.32 CHRONIC KIDNEY DISEASE, STAGE 3B (CMD): ICD-10-CM

## 2023-07-10 DIAGNOSIS — I95.9 HYPOTENSION, UNSPECIFIED HYPOTENSION TYPE: ICD-10-CM

## 2023-07-10 PROCEDURE — 3075F SYST BP GE 130 - 139MM HG: CPT | Performed by: INTERNAL MEDICINE

## 2023-07-10 PROCEDURE — 3079F DIAST BP 80-89 MM HG: CPT | Performed by: INTERNAL MEDICINE

## 2023-07-10 PROCEDURE — 99214 OFFICE O/P EST MOD 30 MIN: CPT | Performed by: INTERNAL MEDICINE

## 2023-07-25 ENCOUNTER — E-ADVICE (OUTPATIENT)
Dept: OTHER | Age: 83
End: 2023-07-25

## 2023-08-02 ENCOUNTER — NURSE TRIAGE (OUTPATIENT)
Dept: INTERNAL MEDICINE | Age: 83
End: 2023-08-02

## 2023-08-02 ENCOUNTER — TELEPHONE (OUTPATIENT)
Dept: INTERNAL MEDICINE | Age: 83
End: 2023-08-02

## 2023-08-04 ENCOUNTER — TELEPHONE (OUTPATIENT)
Dept: INTERNAL MEDICINE | Age: 83
End: 2023-08-04

## 2023-08-08 RX ORDER — FINASTERIDE 5 MG/1
5 TABLET, FILM COATED ORAL DAILY
Qty: 90 TABLET | Refills: 3 | Status: SHIPPED | OUTPATIENT
Start: 2023-08-08

## 2023-08-09 ENCOUNTER — OFFICE VISIT (OUTPATIENT)
Dept: INTERNAL MEDICINE | Age: 83
End: 2023-08-09

## 2023-08-09 ENCOUNTER — LAB SERVICES (OUTPATIENT)
Dept: LAB | Age: 83
End: 2023-08-09

## 2023-08-09 VITALS
WEIGHT: 170 LBS | HEIGHT: 65 IN | RESPIRATION RATE: 16 BRPM | BODY MASS INDEX: 28.32 KG/M2 | DIASTOLIC BLOOD PRESSURE: 62 MMHG | SYSTOLIC BLOOD PRESSURE: 114 MMHG | OXYGEN SATURATION: 95 % | HEART RATE: 66 BPM

## 2023-08-09 DIAGNOSIS — G62.9 PERIPHERAL POLYNEUROPATHY: ICD-10-CM

## 2023-08-09 DIAGNOSIS — R35.0 BENIGN PROSTATIC HYPERPLASIA WITH URINARY FREQUENCY: ICD-10-CM

## 2023-08-09 DIAGNOSIS — N40.1 BENIGN PROSTATIC HYPERPLASIA WITH URINARY FREQUENCY: Primary | ICD-10-CM

## 2023-08-09 DIAGNOSIS — R35.0 INCREASED URINARY FREQUENCY: ICD-10-CM

## 2023-08-09 DIAGNOSIS — R35.0 BENIGN PROSTATIC HYPERPLASIA WITH URINARY FREQUENCY: Primary | ICD-10-CM

## 2023-08-09 DIAGNOSIS — N40.1 BENIGN PROSTATIC HYPERPLASIA WITH URINARY FREQUENCY: ICD-10-CM

## 2023-08-09 LAB
APPEARANCE UR: CLEAR
BILIRUB UR QL STRIP: NEGATIVE
COLOR UR: YELLOW
GLUCOSE UR STRIP-MCNC: NEGATIVE MG/DL
HGB UR QL STRIP: NEGATIVE
KETONES UR STRIP-MCNC: ABNORMAL MG/DL
LEUKOCYTE ESTERASE UR QL STRIP: NEGATIVE
NITRITE UR QL STRIP: NEGATIVE
PH UR STRIP: 6 [PH] (ref 5–7)
PROT UR STRIP-MCNC: NEGATIVE MG/DL
SP GR UR STRIP: 1.01 (ref 1–1.03)
UROBILINOGEN UR STRIP-MCNC: 0.2 MG/DL

## 2023-08-09 PROCEDURE — 3074F SYST BP LT 130 MM HG: CPT | Performed by: INTERNAL MEDICINE

## 2023-08-09 PROCEDURE — 81003 URINALYSIS AUTO W/O SCOPE: CPT | Performed by: INTERNAL MEDICINE

## 2023-08-09 PROCEDURE — 99214 OFFICE O/P EST MOD 30 MIN: CPT | Performed by: INTERNAL MEDICINE

## 2023-08-09 PROCEDURE — 3078F DIAST BP <80 MM HG: CPT | Performed by: INTERNAL MEDICINE

## 2023-08-09 ASSESSMENT — PATIENT HEALTH QUESTIONNAIRE - PHQ9
SUM OF ALL RESPONSES TO PHQ9 QUESTIONS 1 AND 2: 0
SUM OF ALL RESPONSES TO PHQ9 QUESTIONS 1 AND 2: 0
CLINICAL INTERPRETATION OF PHQ2 SCORE: NO FURTHER SCREENING NEEDED
2. FEELING DOWN, DEPRESSED OR HOPELESS: NOT AT ALL
1. LITTLE INTEREST OR PLEASURE IN DOING THINGS: NOT AT ALL

## 2023-08-09 ASSESSMENT — PAIN SCALES - GENERAL: PAINLEVEL: 0

## 2023-08-22 ENCOUNTER — LAB SERVICES (OUTPATIENT)
Dept: LAB | Age: 83
End: 2023-08-22

## 2023-08-22 DIAGNOSIS — N18.31 STAGE 3A CHRONIC KIDNEY DISEASE (CMD): ICD-10-CM

## 2023-08-22 DIAGNOSIS — G62.9 PERIPHERAL POLYNEUROPATHY: ICD-10-CM

## 2023-08-22 DIAGNOSIS — N40.1 BENIGN PROSTATIC HYPERPLASIA WITH URINARY FREQUENCY: ICD-10-CM

## 2023-08-22 DIAGNOSIS — I51.89 DIASTOLIC DYSFUNCTION: ICD-10-CM

## 2023-08-22 DIAGNOSIS — E78.49 OTHER HYPERLIPIDEMIA: ICD-10-CM

## 2023-08-22 DIAGNOSIS — R35.0 BENIGN PROSTATIC HYPERPLASIA WITH URINARY FREQUENCY: ICD-10-CM

## 2023-08-22 DIAGNOSIS — R35.0 INCREASED URINARY FREQUENCY: ICD-10-CM

## 2023-08-22 LAB
ALBUMIN SERPL-MCNC: 4.2 G/DL (ref 3.6–5.1)
ALP SERPL-CCNC: 61 UNITS/L (ref 45–117)
ALT SERPL-CCNC: 11 UNITS/L
ANION GAP SERPL CALC-SCNC: 18 MMOL/L (ref 7–19)
AST SERPL-CCNC: 18 UNITS/L
BASOPHILS # BLD: 0.1 K/MCL (ref 0–0.3)
BASOPHILS NFR BLD: 1 %
BILIRUB CONJ SERPL-MCNC: 0.2 MG/DL (ref 0–0.2)
BILIRUB SERPL-MCNC: 1 MG/DL (ref 0.2–1)
BUN SERPL-MCNC: 27 MG/DL (ref 6–20)
BUN/CREAT SERPL: 17 (ref 7–25)
CALCIUM SERPL-MCNC: 8.8 MG/DL (ref 8.4–10.2)
CHLORIDE SERPL-SCNC: 103 MMOL/L (ref 97–110)
CHOLEST SERPL-MCNC: 129 MG/DL
CHOLEST/HDLC SERPL: 3.8 {RATIO}
CO2 SERPL-SCNC: 24 MMOL/L (ref 21–32)
CREAT SERPL-MCNC: 1.6 MG/DL (ref 0.67–1.17)
DEPRECATED RDW RBC: 45.6 FL (ref 39–50)
EGFRCR SERPLBLD CKD-EPI 2021: 43 ML/MIN/{1.73_M2}
EOSINOPHIL # BLD: 0.4 K/MCL (ref 0–0.5)
EOSINOPHIL NFR BLD: 4 %
ERYTHROCYTE [DISTWIDTH] IN BLOOD: 12.9 % (ref 11–15)
FASTING DURATION TIME PATIENT: 14 HOURS (ref 0–999)
GLUCOSE SERPL-MCNC: 92 MG/DL (ref 70–99)
HCT VFR BLD CALC: 47.2 % (ref 39–51)
HDLC SERPL-MCNC: 34 MG/DL
HGB BLD-MCNC: 16.1 G/DL (ref 13–17)
IMM GRANULOCYTES # BLD AUTO: 0 K/MCL (ref 0–0.2)
IMM GRANULOCYTES # BLD: 0 %
LDLC SERPL CALC-MCNC: 65 MG/DL
LYMPHOCYTES # BLD: 2.6 K/MCL (ref 1–4)
LYMPHOCYTES NFR BLD: 28 %
MCH RBC QN AUTO: 32.7 PG (ref 26–34)
MCHC RBC AUTO-ENTMCNC: 34.1 G/DL (ref 32–36.5)
MCV RBC AUTO: 95.7 FL (ref 78–100)
MONOCYTES # BLD: 0.9 K/MCL (ref 0.3–0.9)
MONOCYTES NFR BLD: 10 %
NEUTROPHILS # BLD: 5.4 K/MCL (ref 1.8–7.7)
NEUTROPHILS NFR BLD: 57 %
NONHDLC SERPL-MCNC: 95 MG/DL
NRBC BLD MANUAL-RTO: 0 /100 WBC
PLATELET # BLD AUTO: 191 K/MCL (ref 140–450)
POTASSIUM SERPL-SCNC: 4.2 MMOL/L (ref 3.4–5.1)
PROT SERPL-MCNC: 7.1 G/DL (ref 6.4–8.2)
RBC # BLD: 4.93 MIL/MCL (ref 4.5–5.9)
SODIUM SERPL-SCNC: 141 MMOL/L (ref 135–145)
TRIGL SERPL-MCNC: 152 MG/DL
TSH SERPL-ACNC: 1.22 MCUNITS/ML (ref 0.35–5)
WBC # BLD: 9.3 K/MCL (ref 4.2–11)

## 2023-08-22 PROCEDURE — 36415 COLL VENOUS BLD VENIPUNCTURE: CPT | Performed by: INTERNAL MEDICINE

## 2023-08-22 PROCEDURE — 80076 HEPATIC FUNCTION PANEL: CPT | Performed by: INTERNAL MEDICINE

## 2023-08-22 PROCEDURE — 80048 BASIC METABOLIC PNL TOTAL CA: CPT | Performed by: INTERNAL MEDICINE

## 2023-08-22 PROCEDURE — 82607 VITAMIN B-12: CPT | Performed by: INTERNAL MEDICINE

## 2023-08-22 PROCEDURE — 84443 ASSAY THYROID STIM HORMONE: CPT | Performed by: INTERNAL MEDICINE

## 2023-08-22 PROCEDURE — 80061 LIPID PANEL: CPT | Performed by: INTERNAL MEDICINE

## 2023-08-22 PROCEDURE — 85025 COMPLETE CBC W/AUTO DIFF WBC: CPT | Performed by: INTERNAL MEDICINE

## 2023-08-22 PROCEDURE — 82746 ASSAY OF FOLIC ACID SERUM: CPT | Performed by: INTERNAL MEDICINE

## 2023-08-23 LAB
FOLATE SERPL-MCNC: >24 NG/ML
VIT B12 SERPL-MCNC: 636 PG/ML (ref 211–911)

## 2023-08-24 ENCOUNTER — OFFICE VISIT (OUTPATIENT)
Dept: INTERNAL MEDICINE | Age: 83
End: 2023-08-24

## 2023-08-24 VITALS
TEMPERATURE: 98 F | OXYGEN SATURATION: 95 % | SYSTOLIC BLOOD PRESSURE: 110 MMHG | WEIGHT: 170 LBS | DIASTOLIC BLOOD PRESSURE: 66 MMHG | HEIGHT: 65 IN | BODY MASS INDEX: 28.32 KG/M2 | HEART RATE: 68 BPM

## 2023-08-24 DIAGNOSIS — Z00.00 ROUTINE GENERAL MEDICAL EXAMINATION AT A HEALTH CARE FACILITY: Primary | ICD-10-CM

## 2023-08-24 DIAGNOSIS — I51.89 DIASTOLIC DYSFUNCTION: ICD-10-CM

## 2023-08-24 DIAGNOSIS — N18.31 STAGE 3A CHRONIC KIDNEY DISEASE (CMD): ICD-10-CM

## 2023-08-24 DIAGNOSIS — E78.2 MIXED HYPERLIPIDEMIA: ICD-10-CM

## 2023-08-24 DIAGNOSIS — N40.1 BENIGN PROSTATIC HYPERPLASIA WITH URINARY RETENTION: ICD-10-CM

## 2023-08-24 DIAGNOSIS — R33.8 BENIGN PROSTATIC HYPERPLASIA WITH URINARY RETENTION: ICD-10-CM

## 2023-08-24 PROCEDURE — G0439 PPPS, SUBSEQ VISIT: HCPCS | Performed by: INTERNAL MEDICINE

## 2023-08-24 PROCEDURE — 99214 OFFICE O/P EST MOD 30 MIN: CPT | Performed by: INTERNAL MEDICINE

## 2023-08-24 PROCEDURE — 3074F SYST BP LT 130 MM HG: CPT | Performed by: INTERNAL MEDICINE

## 2023-08-24 PROCEDURE — 3078F DIAST BP <80 MM HG: CPT | Performed by: INTERNAL MEDICINE

## 2023-08-24 ASSESSMENT — PATIENT HEALTH QUESTIONNAIRE - PHQ9
1. LITTLE INTEREST OR PLEASURE IN DOING THINGS: SEVERAL DAYS
2. FEELING DOWN, DEPRESSED OR HOPELESS: SEVERAL DAYS
SUM OF ALL RESPONSES TO PHQ9 QUESTIONS 1 AND 2: 2
CLINICAL INTERPRETATION OF PHQ2 SCORE: NO FURTHER SCREENING NEEDED
SUM OF ALL RESPONSES TO PHQ9 QUESTIONS 1 AND 2: 2

## 2023-08-26 DIAGNOSIS — I51.89 DIASTOLIC DYSFUNCTION: ICD-10-CM

## 2023-08-26 DIAGNOSIS — I10 SUPINE HYPERTENSION: ICD-10-CM

## 2023-08-28 ENCOUNTER — OFFICE VISIT (OUTPATIENT)
Dept: SURGERY | Facility: CLINIC | Age: 83
End: 2023-08-28

## 2023-08-28 DIAGNOSIS — R82.90 URINE FINDING: ICD-10-CM

## 2023-08-28 DIAGNOSIS — N32.81 OAB (OVERACTIVE BLADDER): Primary | ICD-10-CM

## 2023-08-28 DIAGNOSIS — G20 PARKINSON'S DISEASE (HCC): ICD-10-CM

## 2023-08-28 LAB
APPEARANCE: CLEAR
BILIRUBIN: NEGATIVE
GLUCOSE (URINE DIPSTICK): NEGATIVE MG/DL
KETONES (URINE DIPSTICK): NEGATIVE MG/DL
LEUKOCYTES: NEGATIVE
MULTISTIX LOT#: NORMAL NUMERIC
NITRITE, URINE: NEGATIVE
OCCULT BLOOD: NEGATIVE
PH, URINE: 6 (ref 4.5–8)
PROTEIN (URINE DIPSTICK): NEGATIVE MG/DL
SPECIFIC GRAVITY: 1 (ref 1–1.03)
URINE-COLOR: YELLOW
UROBILINOGEN,SEMI-QN: 0.2 MG/DL (ref 0–1.9)

## 2023-08-28 PROCEDURE — 81003 URINALYSIS AUTO W/O SCOPE: CPT | Performed by: PHYSICIAN ASSISTANT

## 2023-08-28 PROCEDURE — 99203 OFFICE O/P NEW LOW 30 MIN: CPT | Performed by: PHYSICIAN ASSISTANT

## 2023-08-28 PROCEDURE — 1159F MED LIST DOCD IN RCRD: CPT | Performed by: PHYSICIAN ASSISTANT

## 2023-08-28 PROCEDURE — 51798 US URINE CAPACITY MEASURE: CPT | Performed by: PHYSICIAN ASSISTANT

## 2023-08-28 PROCEDURE — 1160F RVW MEDS BY RX/DR IN RCRD: CPT | Performed by: PHYSICIAN ASSISTANT

## 2023-08-28 RX ORDER — HYDRALAZINE HYDROCHLORIDE 10 MG/1
1 TABLET, FILM COATED ORAL NIGHTLY
COMMUNITY
Start: 2022-11-07

## 2023-08-28 RX ORDER — LEVODOPA AND CARBIDOPA 195; 48.75 MG/1; MG/1
CAPSULE, EXTENDED RELEASE ORAL
COMMUNITY
Start: 2023-05-20

## 2023-08-28 RX ORDER — CARVEDILOL 6.25 MG/1
TABLET ORAL 2 TIMES DAILY WITH MEALS
COMMUNITY
Start: 2022-10-24

## 2023-08-28 RX ORDER — PSEUDOEPHEDRINE HCL 30 MG
100 TABLET ORAL 2 TIMES DAILY
COMMUNITY

## 2023-08-28 RX ORDER — HYDRALAZINE HYDROCHLORIDE 10 MG/1
TABLET, FILM COATED ORAL
Qty: 90 TABLET | Refills: 1 | Status: SHIPPED | OUTPATIENT
Start: 2023-08-28

## 2023-08-28 RX ORDER — RISEDRONATE SODIUM 35 MG/1
35 TABLET, FILM COATED ORAL
COMMUNITY
Start: 2022-11-16

## 2023-08-28 RX ORDER — BIOTIN 10 MG
2 TABLET ORAL DAILY
COMMUNITY
Start: 2022-04-07

## 2023-08-28 RX ORDER — POLYETHYLENE GLYCOL 3350 17 G/17G
17 POWDER, FOR SOLUTION ORAL DAILY
COMMUNITY

## 2023-08-28 RX ORDER — MIRABEGRON 25 MG/1
TABLET, FILM COATED, EXTENDED RELEASE ORAL
COMMUNITY
Start: 2023-08-10

## 2023-08-28 RX ORDER — CARVEDILOL 3.12 MG/1
TABLET ORAL
COMMUNITY
Start: 2022-09-03

## 2023-08-28 RX ORDER — MIRABEGRON 25 MG/1
25 TABLET, FILM COATED, EXTENDED RELEASE ORAL DAILY
Qty: 90 TABLET | Refills: 3 | Status: SHIPPED | OUTPATIENT
Start: 2023-08-28 | End: 2024-08-22

## 2023-08-28 RX ORDER — SODIUM FLUORIDE 6 MG/ML
PASTE, DENTIFRICE DENTAL
COMMUNITY
Start: 2023-04-21

## 2023-08-28 NOTE — PROGRESS NOTES
[facility] MEDICAL [facility], [address], [address] [facility]    Urology Consult Note    History of Present Illness:   Patient is a 81year old male with hx of IBS, HL, Parkinsons', CVA, who presents today for consultation from Dr. Elie Cunningham office for OAB. Patient previously followed by Dr. Sal Ramsey dating back to 2014. Last seen by [facility] urology in 2019 for urinary frequency and UUI. He was on tamsulosin and finasteride, myrbetriq added. His myrbetriq was increased to 50mg but BP increased and he was decreased back to 25mg. He discontinued the medication because he was doing well. For a short period of time he resumed again at [facility] with PCP. Has had a few episodes of worsening urinary complaints, most recently back on August 10, 2023. Has had improvement again. Currently on nocturia x 3, no daytime complaints. No dysuria, gross hematuria. Good urine stream, strong continuous. No incontinence. Had urinary retention following stroke. He was discharged without the domingo catheter. UA today negative, PVR pending    Bowels have been good. No UTIs. HISTORY:  Past Medical History:   Diagnosis Date    Blood in the stool     Constipation     Eye disease     Fatigue     Frequent use of laxatives     Hemorrhoids     High cholesterol     Irregular bowel habits     Wears glasses       Past Surgical History:   Procedure Laterality Date    COLONOSCOPY        Family History   Problem Relation Age of Onset    Colon Cancer Father     Heart Attack Father       Social History:   Social History     Socioeconomic History    Marital status:    Tobacco Use    Smoking status: Never    Smokeless tobacco: Never   Vaping Use    Vaping Use: Never used   Substance and Sexual Activity    Alcohol use:  Yes     Alcohol/week: 3.0 standard drinks of alcohol     Types: 2 Glasses of wine, 1 Cans of beer per week    Drug use: No   Other Topics Concern    Caffeine Concern No    Exercise No        Allergies    Pramipexole Dihydro* UNKNOWN    Comment:Confusion             Confusion    Review of Systems:   A 10-point review of systems was completed and is negative other than as noted above. Physical Exam:   There were no vitals taken for this visit. GENERAL APPEARANCE: well developed, well nourished, in no acute distress  HEAD: atraumatic, normocephalic  EYES: sclera non-icteric  ORAL CAVITY: mucosa moist  NECK/THYROID: no obvious masses or goiter  LUNGS: non-labored breathing  ABDOMEN: soft, nontender, non distended  No CVAT  EXTREMITIES: warm, well-perfused. No clubbing, cyanosis or edema. SKIN: no obvious rashes  In wheelchair    Results:     Laboratory Data:  No results found for: WBC, HGB, PLT  No results found for: NA, K, CL, CO2, BUN, GLU, GFRAA, AST, ALT, TP, ALB, PHOS, CA, MG    Urinalysis Results (last three years):  No results for input(s)  in the last 3 years    Urine Culture Results (last three years):  No results found for: URINECUL    Imaging  No results found. Impression:     Patient is a 81year old male  with hx of IBS, HL, Parkinsons', who presents today for consultation from Dr. Nella Zamorano office for OAB. Reviewed potential contributing factors and treatment options for OAB  Patient doing well subjectively and is satisfied with current voiding habits. Prior retention with stroke, no difficulties voiding at present, will monitor  UA today negative. Recommendations:  Check PVR, if appropriate will continue Myrbetriq 25mg at this time. May follow up in 1 year or sooner, prn. Thank you very much for this consult. Please call if there are any questions or concerns. Regis Andrea PA-C  Urology  [facility] [address]    Date: 8/28/2023        ADDENDUM- PVR ~200mL. Last voided 45 minutes prior. Recommend repeat PVR in 2-4 weeks with nursing staff. If remains elevated would need to consider d'c myrbetriq.   Vignesh SEGAL

## 2023-09-11 ENCOUNTER — NURSE ONLY (OUTPATIENT)
Dept: SURGERY | Facility: CLINIC | Age: 83
End: 2023-09-11

## 2023-09-11 DIAGNOSIS — N32.81 OAB (OVERACTIVE BLADDER): Primary | ICD-10-CM

## 2023-09-11 PROCEDURE — 51798 US URINE CAPACITY MEASURE: CPT | Performed by: PHYSICIAN ASSISTANT

## 2023-09-11 NOTE — PROGRESS NOTES
Wife states that he is doing better since starting the Myrbetriq. Not getting up as often at night. Patient voided at home at 12:00 pm.  Does not have the urge to void at this time. PVR= 187 ml. Panfilo Sierra PA-C notified. Patient to follow up with Dr Tyrone Molina in 6 months or prn.

## 2023-09-16 ENCOUNTER — TELEPHONE (OUTPATIENT)
Dept: UROLOGY | Age: 83
End: 2023-09-16

## 2023-10-09 RX ORDER — RISEDRONATE SODIUM 35 MG/1
35 TABLET, FILM COATED ORAL
Qty: 4 TABLET | Refills: 11 | Status: SHIPPED | OUTPATIENT
Start: 2023-10-09

## 2023-10-16 ENCOUNTER — OFFICE VISIT (OUTPATIENT)
Facility: LOCATION | Age: 83
End: 2023-10-16
Payer: MEDICARE

## 2023-10-16 DIAGNOSIS — R13.19 ESOPHAGEAL DYSPHAGIA: ICD-10-CM

## 2023-10-16 DIAGNOSIS — H90.3 SENSORINEURAL HEARING LOSS (SNHL) OF BOTH EARS: ICD-10-CM

## 2023-10-16 DIAGNOSIS — H61.23 EXCESSIVE CERUMEN IN BOTH EAR CANALS: Primary | ICD-10-CM

## 2023-10-16 DIAGNOSIS — H61.23 BILATERAL IMPACTED CERUMEN: Primary | ICD-10-CM

## 2023-10-16 PROCEDURE — 99214 OFFICE O/P EST MOD 30 MIN: CPT | Performed by: OTOLARYNGOLOGY

## 2023-10-16 PROCEDURE — 1159F MED LIST DOCD IN RCRD: CPT | Performed by: OTOLARYNGOLOGY

## 2023-10-16 PROCEDURE — 31575 DIAGNOSTIC LARYNGOSCOPY: CPT | Performed by: OTOLARYNGOLOGY

## 2023-10-16 PROCEDURE — 92504 EAR MICROSCOPY EXAMINATION: CPT | Performed by: OTOLARYNGOLOGY

## 2023-10-16 PROCEDURE — 1160F RVW MEDS BY RX/DR IN RCRD: CPT | Performed by: OTOLARYNGOLOGY

## 2023-10-16 RX ORDER — TAMSULOSIN HYDROCHLORIDE 0.4 MG/1
0.4 CAPSULE ORAL EVERY EVENING
Qty: 90 CAPSULE | Refills: 3 | Status: SHIPPED | OUTPATIENT
Start: 2023-10-16

## 2023-10-16 RX ORDER — FLUTICASONE PROPIONATE 50 MCG
2 SPRAY, SUSPENSION (ML) NASAL DAILY
Qty: 16 G | Refills: 3 | Status: SHIPPED | OUTPATIENT
Start: 2023-10-16

## 2023-10-16 NOTE — PROGRESS NOTES
Quin Gallagher is a 81year old male. Patient presents with:  Hearing Check  Cerumen Impaction  Throat Problem    HPI:   27-year-old white male with Parkinson's and stroke a year ago complaining of decreased hearing no otorrhea otalgia vertigo. Although he is somewhat confined to a wheelchair. Has also had some swallowing issues. Current Outpatient Medications   Medication Sig Dispense Refill    carvedilol 3.125 MG Oral Tab TAKE 1 TABLET BY MOUTH DAILY BEFORE AND BREAKFAST AS DIRECTED      carvedilol 6.25 MG Oral Tab Take by mouth 2 (two) times daily with meals. hydrALAZINE 10 MG Oral Tab Take 1 tablet (10 mg total) by mouth nightly. docusate sodium 100 MG Oral Cap Take 100 mg by mouth 2 (two) times daily. Multiple Vitamins-Minerals (MULTIVITAMIN ADULT) Oral Chew Tab Chew 2 tablets by mouth daily. Omeprazole 20 MG Oral Tablet Delayed Release Dispersible Take 20 mg by mouth daily. risedronate 35 MG Oral Tab Take 1 tablet (35 mg total) by mouth every 7 days. SODIUM FLUORIDE 5000 PPM 1.1 % Dental Paste       RYTARY 48. MG Oral Cap CR 1 capsule BID      polyethylene glycol, PEG 3350, 17 g Oral Powd Pack Take 17 g by mouth daily. Mirabegron ER (MYRBETRIQ) 25 MG Oral Tablet 24 Hr Take 1 tablet (25 mg total) by mouth daily. 90 tablet 3    acetaminophen 500 MG Oral Tab       finasteride 5 MG Oral Tab       RYTARY 23.75-95 MG Oral Cap CR 1 tablet nightly. tamsulosin (FLOMAX) cap       Carbidopa-Levodopa ER  MG Oral Tab CR Take  tablets by mouth nightly. Take two a night      GOCOVRI 137 MG Oral Capsule SR 24 Hr Take 137 mg by mouth daily. atorvastatin 10 MG Oral Tab Take 1 tablet (10 mg total) by mouth daily. Carbidopa-Levodopa ER 36. MG Oral Cap CR Take 145 mg by mouth 3 (three) times daily. ascorbic acid 250 MG Oral Tab Take 1 tablet (250 mg total) by mouth 2 (two) times daily.         Past Medical History:   Diagnosis Date    Blood in the stool     Constipation     Eye disease     Fatigue     Frequent use of laxatives     Hemorrhoids     High cholesterol     Irregular bowel habits     Wears glasses       Social History:  Social History     Socioeconomic History    Marital status:    Tobacco Use    Smoking status: Never    Smokeless tobacco: Never   Vaping Use    Vaping Use: Never used   Substance and Sexual Activity    Alcohol use: Yes     Alcohol/week: 3.0 standard drinks of alcohol     Types: 2 Glasses of wine, 1 Cans of beer per week    Drug use: No   Other Topics Concern    Caffeine Concern No    Exercise No      Past Surgical History:   Procedure Laterality Date    COLONOSCOPY           REVIEW OF SYSTEMS:   GENERAL HEALTH: feels well otherwise  GENERAL : denies fever, chills, sweats, weight loss, weight gain  SKIN: denies any unusual skin lesions or rashes  RESPIRATORY: denies shortness of breath with exertion  NEURO: denies headaches    EXAM:   There were no vitals taken for this visit. System Pertinent findings Details   Constitutional  Overall appearance - Normal.   Head/Face  Facial features -- Normal. Skull - Normal.   Eyes  Pupils equal ,round ,react to light and accomidate   Ears  External Ear Right: Normal, Left: Normal. Canal - Right: Normal, Left: Normal. TM - Right: Wax removed TM normal left: Wax removed TM normal   Nose  External Nose, Normal, Septum -deviated to the left,Nasal Vault, clear no polyps. Turbinates - Right: Normal left: Normal   Mouth/Throat  Lips/teeth/gums - Normal. Tonsils -1+ oropharynx - Normal.   Neck Exam  Inspection - Normal. Palpation - Normal. Parotid gland - Normal. Thyroid gland -normal   Lymph Detail  Submental. Submandibular. Anterior cervical. Posterior cervical. Supraclavicular. Flexible Laryngoscopy Procedure Note ([id number])    Due to inability for adequate examination of the larynx and need for magnification to perform the examination, endoscopy was performed.   Risks and benefits were discussed with patient/family and they have given verbal consent to proceed. Pre-operative Diagnosis: Bilateral impacted cerumen  (primary encounter diagnosis)  Esophageal dysphagia    Post-operative Diagnosis: Same    Procedure: Diagnostic flexible fiberoptic laryngoscopy    Anesthesia: Topical anesthetic Spray     Surgeon Liana Oconnell MD    EBL: 0    Procedure Detail & Findings: The patient was topically anesthetized within the nose, bilaterally. The flexible laryngoscope was placed through the nostrils bilaterally. The findings within the nose deviated to the left. The findings within the nasopharynx clear. The findings within the hypopharynx no masses. The findings within the larynx normal vocal cord mobility no masses lesions he has mucus bridging  . Condition: Stable    Complications: Patient tolerated the procedure well with no immediate complication. Evelyne Machado MD  Patients exam showed wax impaction right ear, wax impaction left ear. Ear wax was removed under the operative microscope. No complications. Patient tolerated the procedure well. Ear exam findings listed in note after removal.  Diagram shows downsloping high-frequency sensorineural hearing loss  ASSESSMENT AND PLAN:   1. Bilateral impacted cerumen  Patient has in addition a sensorineural hearing loss and would be medically cleared for hearing aids    2. Esophageal dysphagia  He has mucus bridging him to try Flonase nasal spray although the bulk of his issues are likely due to the Parkinson's      The patient indicates understanding of these issues and agrees to the plan.       Evelyne Machado MD  10/16/2023  2:30 PM

## 2023-10-24 ENCOUNTER — OFFICE VISIT (OUTPATIENT)
Dept: CARDIOLOGY | Age: 83
End: 2023-10-24

## 2023-10-24 VITALS
BODY MASS INDEX: 28.32 KG/M2 | HEIGHT: 65 IN | HEART RATE: 66 BPM | OXYGEN SATURATION: 96 % | SYSTOLIC BLOOD PRESSURE: 120 MMHG | DIASTOLIC BLOOD PRESSURE: 82 MMHG | WEIGHT: 170 LBS

## 2023-10-24 DIAGNOSIS — I25.84 CORONARY ARTERY CALCIFICATION: ICD-10-CM

## 2023-10-24 DIAGNOSIS — I69.30 HISTORY OF STROKE WITH RESIDUAL EFFECTS: ICD-10-CM

## 2023-10-24 DIAGNOSIS — G20.A1 PARKINSON'S DISEASE, UNSPECIFIED WHETHER DYSKINESIA PRESENT, UNSPECIFIED WHETHER MANIFESTATIONS FLUCTUATE: ICD-10-CM

## 2023-10-24 DIAGNOSIS — I25.10 CORONARY ARTERY CALCIFICATION: ICD-10-CM

## 2023-10-24 DIAGNOSIS — I77.810 DILATED AORTIC ROOT (CMD): ICD-10-CM

## 2023-10-24 DIAGNOSIS — E78.2 MIXED HYPERLIPIDEMIA: ICD-10-CM

## 2023-10-24 DIAGNOSIS — N28.9 RENAL INSUFFICIENCY: ICD-10-CM

## 2023-10-24 DIAGNOSIS — I1A.0 RESISTANT HYPERTENSION: Primary | ICD-10-CM

## 2023-10-24 PROCEDURE — 3074F SYST BP LT 130 MM HG: CPT

## 2023-10-24 PROCEDURE — 99214 OFFICE O/P EST MOD 30 MIN: CPT

## 2023-10-24 PROCEDURE — 3079F DIAST BP 80-89 MM HG: CPT

## 2023-10-25 ENCOUNTER — EXTERNAL RECORD (OUTPATIENT)
Dept: HEALTH INFORMATION MANAGEMENT | Facility: OTHER | Age: 83
End: 2023-10-25

## 2023-11-11 DIAGNOSIS — R93.1 AGATSTON CORONARY ARTERY CALCIUM SCORE GREATER THAN 400: ICD-10-CM

## 2023-11-11 DIAGNOSIS — I10 SUPINE HYPERTENSION: ICD-10-CM

## 2023-11-14 RX ORDER — CARVEDILOL 3.12 MG/1
TABLET ORAL
Qty: 110 TABLET | Refills: 1 | Status: SHIPPED | OUTPATIENT
Start: 2023-11-14

## 2023-11-14 RX ORDER — CARVEDILOL 6.25 MG/1
6.25 TABLET ORAL NIGHTLY
Qty: 90 TABLET | Refills: 1 | Status: SHIPPED | OUTPATIENT
Start: 2023-11-14

## 2023-12-06 ENCOUNTER — LAB SERVICES (OUTPATIENT)
Dept: LAB | Age: 83
End: 2023-12-06

## 2023-12-06 DIAGNOSIS — I51.89 DIASTOLIC DYSFUNCTION: ICD-10-CM

## 2023-12-06 DIAGNOSIS — E78.2 MIXED HYPERLIPIDEMIA: ICD-10-CM

## 2023-12-06 DIAGNOSIS — N18.31 STAGE 3A CHRONIC KIDNEY DISEASE (CMD): ICD-10-CM

## 2023-12-06 LAB
BASOPHILS # BLD: 0.1 K/MCL (ref 0–0.3)
BASOPHILS NFR BLD: 1 %
DEPRECATED RDW RBC: 45.7 FL (ref 39–50)
EOSINOPHIL # BLD: 0.3 K/MCL (ref 0–0.5)
EOSINOPHIL NFR BLD: 3 %
ERYTHROCYTE [DISTWIDTH] IN BLOOD: 12.9 % (ref 11–15)
HCT VFR BLD CALC: 51.1 % (ref 39–51)
HGB BLD-MCNC: 17.4 G/DL (ref 13–17)
IMM GRANULOCYTES # BLD AUTO: 0 K/MCL (ref 0–0.2)
IMM GRANULOCYTES # BLD: 0 %
LYMPHOCYTES # BLD: 2.9 K/MCL (ref 1–4)
LYMPHOCYTES NFR BLD: 28 %
MCH RBC QN AUTO: 33.3 PG (ref 26–34)
MCHC RBC AUTO-ENTMCNC: 34.1 G/DL (ref 32–36.5)
MCV RBC AUTO: 97.7 FL (ref 78–100)
MONOCYTES # BLD: 1 K/MCL (ref 0.3–0.9)
MONOCYTES NFR BLD: 10 %
NEUTROPHILS # BLD: 6 K/MCL (ref 1.8–7.7)
NEUTROPHILS NFR BLD: 58 %
NRBC BLD MANUAL-RTO: 0 /100 WBC
PLATELET # BLD AUTO: 199 K/MCL (ref 140–450)
RBC # BLD: 5.23 MIL/MCL (ref 4.5–5.9)
WBC # BLD: 10.4 K/MCL (ref 4.2–11)

## 2023-12-06 PROCEDURE — 85025 COMPLETE CBC W/AUTO DIFF WBC: CPT | Performed by: INTERNAL MEDICINE

## 2023-12-06 PROCEDURE — 36415 COLL VENOUS BLD VENIPUNCTURE: CPT | Performed by: INTERNAL MEDICINE

## 2023-12-06 PROCEDURE — 80048 BASIC METABOLIC PNL TOTAL CA: CPT | Performed by: INTERNAL MEDICINE

## 2023-12-06 PROCEDURE — 80076 HEPATIC FUNCTION PANEL: CPT | Performed by: INTERNAL MEDICINE

## 2023-12-07 LAB
ALBUMIN SERPL-MCNC: 4.9 G/DL (ref 3.6–5.1)
ALP SERPL-CCNC: 63 UNITS/L (ref 45–117)
ALT SERPL-CCNC: 11 UNITS/L
ANION GAP SERPL CALC-SCNC: 14 MMOL/L (ref 7–19)
AST SERPL-CCNC: 19 UNITS/L
BILIRUB CONJ SERPL-MCNC: 0.1 MG/DL (ref 0–0.2)
BILIRUB SERPL-MCNC: 0.6 MG/DL (ref 0.2–1)
BUN SERPL-MCNC: 27 MG/DL (ref 6–20)
BUN/CREAT SERPL: 16 (ref 7–25)
CALCIUM SERPL-MCNC: 10 MG/DL (ref 8.4–10.2)
CHLORIDE SERPL-SCNC: 100 MMOL/L (ref 97–110)
CO2 SERPL-SCNC: 28 MMOL/L (ref 21–32)
CREAT SERPL-MCNC: 1.74 MG/DL (ref 0.67–1.17)
EGFRCR SERPLBLD CKD-EPI 2021: 39 ML/MIN/{1.73_M2}
FASTING DURATION TIME PATIENT: ABNORMAL H
GLUCOSE SERPL-MCNC: 90 MG/DL (ref 70–99)
POTASSIUM SERPL-SCNC: 4.6 MMOL/L (ref 3.4–5.1)
PROT SERPL-MCNC: 7.8 G/DL (ref 6.4–8.2)
SODIUM SERPL-SCNC: 137 MMOL/L (ref 135–145)

## 2023-12-11 ENCOUNTER — APPOINTMENT (OUTPATIENT)
Dept: INTERNAL MEDICINE | Age: 83
End: 2023-12-11

## 2023-12-11 VITALS
DIASTOLIC BLOOD PRESSURE: 84 MMHG | WEIGHT: 163 LBS | OXYGEN SATURATION: 96 % | BODY MASS INDEX: 27.16 KG/M2 | HEART RATE: 64 BPM | HEIGHT: 65 IN | SYSTOLIC BLOOD PRESSURE: 138 MMHG | TEMPERATURE: 97.6 F

## 2023-12-11 DIAGNOSIS — Z23 NEED FOR INFLUENZA VACCINATION: ICD-10-CM

## 2023-12-11 DIAGNOSIS — I1A.0 RESISTANT HYPERTENSION: ICD-10-CM

## 2023-12-11 DIAGNOSIS — I51.89 DIASTOLIC DYSFUNCTION: Primary | ICD-10-CM

## 2023-12-11 DIAGNOSIS — E78.2 MIXED HYPERLIPIDEMIA: ICD-10-CM

## 2023-12-11 PROCEDURE — 90662 IIV NO PRSV INCREASED AG IM: CPT | Performed by: INTERNAL MEDICINE

## 2023-12-11 PROCEDURE — G0008 ADMIN INFLUENZA VIRUS VAC: HCPCS | Performed by: INTERNAL MEDICINE

## 2023-12-11 PROCEDURE — 3079F DIAST BP 80-89 MM HG: CPT | Performed by: INTERNAL MEDICINE

## 2023-12-11 PROCEDURE — 3075F SYST BP GE 130 - 139MM HG: CPT | Performed by: INTERNAL MEDICINE

## 2023-12-11 PROCEDURE — 99214 OFFICE O/P EST MOD 30 MIN: CPT | Performed by: INTERNAL MEDICINE

## 2023-12-15 ENCOUNTER — OFF PREMISE (OUTPATIENT)
Dept: HEALTH INFORMATION MANAGEMENT | Facility: OTHER | Age: 83
End: 2023-12-15

## 2023-12-18 ENCOUNTER — EXTERNAL RECORD (OUTPATIENT)
Dept: HEALTH INFORMATION MANAGEMENT | Facility: OTHER | Age: 83
End: 2023-12-18

## 2023-12-20 ENCOUNTER — TELEPHONE (OUTPATIENT)
Dept: INTERNAL MEDICINE | Age: 83
End: 2023-12-20

## 2023-12-21 ENCOUNTER — E-ADVICE (OUTPATIENT)
Dept: INTERNAL MEDICINE | Age: 83
End: 2023-12-21

## 2024-01-08 ENCOUNTER — APPOINTMENT (OUTPATIENT)
Dept: DERMATOLOGY | Age: 84
End: 2024-01-08

## 2024-01-30 ENCOUNTER — TELEPHONE (OUTPATIENT)
Dept: INTERNAL MEDICINE | Age: 84
End: 2024-01-30

## 2024-02-19 RX ORDER — CARVEDILOL 6.25 MG/1
6.25 TABLET ORAL NIGHTLY
Qty: 90 TABLET | Refills: 1 | Status: SHIPPED | OUTPATIENT
Start: 2024-02-19

## 2024-02-19 RX ORDER — RISEDRONATE SODIUM 35 MG/1
35 TABLET, FILM COATED ORAL
Qty: 4 TABLET | Refills: 11 | OUTPATIENT
Start: 2024-02-19

## 2024-03-05 NOTE — PROGRESS NOTES
HPI:     Emmanuel Brower is a 84 year old male with a PMH of Parkinson's, CVA, HL, IBS  New to me, saw Marva in the past.    Following for:  1. OAB/UI  - myrbetriq 25  2. BPH/LUTS  - flomax 2018, proscar 2019    PCP - Amrit Brumfield Urologist - Marva 8/28/23, [place] 9/12/19    He feels well. Appetite and energy are good. He is taking keturah/pro, 25 myrbetriq. Feels great. No lightheadedness.    He lives at home wife and wife assists with cooking, cleaning. Ambulates with walker in the house and WC outside.    AUA SS is 4/35 with 2 n; 1 u, f.. Mostly happy with LUTS.  Incontinence: none  Pt in WC and has poor mobility.    UA was negative and PVR is 252 mL but had him void again and PVR zero.    UTI hx: none  Gross hematuria: none  Tobacco hx: none  Kidney stone hx: none  Fam h/o  malignancy: none  Diarrhea/constipation: uses dulcolax for NL bowel regimen    Poor potency and prefers observation    Drinks ~ 40 oz water, 16 juice with light yellow urine. I encouraged the pt drink at least 40-60 oz water per day or enough to keep urine clear to pale yellow to ensure adequate hydration.    No PSAs is system. We discussed the risks and benefits to PSA screening and he would prefer  to check but will remain conservative given age.    Renal US 8/4/19: lobular appearance to both kidneys, medical renal dz, no stones/cysts/other abnormalities    Will continue keturah/pro for BPH/LUTS, 25 myrbetriq for OAB, observation for ED. F/u in 1 y for check-up with PVR.      HISTORY:  Past Medical History:   Diagnosis Date    Blood in the stool     Constipation     Eye disease     Fatigue     Frequent use of laxatives     Hemorrhoids     High cholesterol     Irregular bowel habits     Wears glasses       Past Surgical History:   Procedure Laterality Date    COLONOSCOPY        Family History   Problem Relation Age of Onset    Colon Cancer Father     Heart Attack Father       Social History:   Social History     Socioeconomic History     Marital status:    Tobacco Use    Smoking status: Never    Smokeless tobacco: Never   Vaping Use    Vaping Use: Never used   Substance and Sexual Activity    Alcohol use: Yes     Alcohol/week: 3.0 standard drinks of alcohol     Types: 2 Glasses of wine, 1 Cans of beer per week    Drug use: No   Other Topics Concern    Caffeine Concern No    Exercise No        Medications (Active prior to today's visit):  Current Outpatient Medications   Medication Sig Dispense Refill    bisacodyl 10 MG Rectal Suppos Place 1 suppository (10 mg total) rectally daily as needed.      finasteride 5 MG Oral Tab Take 1 tablet (5 mg total) by mouth daily. 90 tablet 5    tamsulosin 0.4 MG Oral Cap Take 1 capsule (0.4 mg total) by mouth daily. 90 capsule 5    Mirabegron ER (MYRBETRIQ) 25 MG Oral Tablet 24 Hr Take 1 tablet (25 mg total) by mouth daily. 90 tablet 5    fluticasone propionate 50 MCG/ACT Nasal Suspension 2 sprays by Nasal route daily. 16 g 3    carvedilol 3.125 MG Oral Tab TAKE 1 TABLET BY MOUTH DAILY BEFORE AND BREAKFAST AS DIRECTED      carvedilol 6.25 MG Oral Tab Take by mouth 2 (two) times daily with meals.      hydrALAZINE 10 MG Oral Tab Take 1 tablet (10 mg total) by mouth nightly.      docusate sodium 100 MG Oral Cap Take 100 mg by mouth 2 (two) times daily.      Multiple Vitamins-Minerals (MULTIVITAMIN ADULT) Oral Chew Tab Chew 2 tablets by mouth daily.      Omeprazole 20 MG Oral Tablet Delayed Release Dispersible Take 20 mg by mouth daily.      risedronate 35 MG Oral Tab Take 1 tablet (35 mg total) by mouth every 7 days.      SODIUM FLUORIDE 5000 PPM 1.1 % Dental Paste       RYTARY 48. MG Oral Cap CR 1 capsule BID      polyethylene glycol, PEG 3350, 17 g Oral Powd Pack Take 17 g by mouth daily.      acetaminophen 500 MG Oral Tab       RYTARY 23.75-95 MG Oral Cap CR 1 tablet nightly.        Carbidopa-Levodopa ER  MG Oral Tab CR Take  tablets by mouth nightly. Take two a night      GOCOVRI 137 MG  Oral Capsule SR 24 Hr Take 137 mg by mouth daily.        atorvastatin 10 MG Oral Tab Take 1 tablet (10 mg total) by mouth daily.      Carbidopa-Levodopa ER 36. MG Oral Cap CR Take 145 mg by mouth 3 (three) times daily.        ascorbic acid 250 MG Oral Tab Take 1 tablet (250 mg total) by mouth 2 (two) times daily.         Allergies:  Allergies   Allergen Reactions    Pramipexole HALLUCINATION and OTHER (SEE COMMENTS)     Confusion   Confusion   Confusion   Confusion   Confusion   Confusion    Pramipexole Dihydrochloride UNKNOWN     Confusion  Confusion           ROS:     A comprehensive 10 point review of systems was completed.  Pertinent positives and negatives noted in the the HPI.    PHYSICAL EXAM:     GENERAL APPEARANCE: well, developed, well nourished, in no acute distress  NEUROLOGIC: nonfocal, alert and oriented  HEAD: normocephalic, atraumatic  EYES: sclera non-icteric  EARS: hearing intact  ORAL CAVITY: mucosa moist  NECK/THYROID: no obvious goiter or masses  LUNGS: nonlabored breathing  ABDOMEN: soft, no obvious masses or tenderness  SKIN: no obvious rashes    : as noted above    ASSESSMENT/PLAN:   Diagnoses and all orders for this visit:    BPH with obstruction/lower urinary tract symptoms  -     URINALYSIS, AUTO, W/O SCOPE  -     finasteride 5 MG Oral Tab; Take 1 tablet (5 mg total) by mouth daily.  -     tamsulosin 0.4 MG Oral Cap; Take 1 capsule (0.4 mg total) by mouth daily.    OAB (overactive bladder)  -     Mirabegron ER (MYRBETRIQ) 25 MG Oral Tablet 24 Hr; Take 1 tablet (25 mg total) by mouth daily.    Erectile dysfunction, unspecified erectile dysfunction type    Elevated PSA  -     PSA Total, Diagnostic; Future      - as noted above.    Thanks again for this consult.    Fransisco Young MD, FACS  Urologist  [facility]  Office: [phone]

## 2024-03-06 ENCOUNTER — APPOINTMENT (OUTPATIENT)
Dept: DERMATOLOGY | Age: 84
End: 2024-03-06

## 2024-03-06 DIAGNOSIS — Z12.83 SCREENING EXAM FOR SKIN CANCER: ICD-10-CM

## 2024-03-06 DIAGNOSIS — L85.3 XEROSIS CUTIS: ICD-10-CM

## 2024-03-06 DIAGNOSIS — D18.01 CHERRY ANGIOMA: Primary | ICD-10-CM

## 2024-03-08 ENCOUNTER — APPOINTMENT (OUTPATIENT)
Dept: CARDIOLOGY | Age: 84
End: 2024-03-08

## 2024-03-08 VITALS
HEART RATE: 65 BPM | DIASTOLIC BLOOD PRESSURE: 74 MMHG | SYSTOLIC BLOOD PRESSURE: 120 MMHG | OXYGEN SATURATION: 96 % | WEIGHT: 172 LBS | HEIGHT: 65 IN | BODY MASS INDEX: 28.66 KG/M2 | RESPIRATION RATE: 18 BRPM

## 2024-03-08 DIAGNOSIS — G20.A1 PARKINSON'S DISEASE, UNSPECIFIED WHETHER DYSKINESIA PRESENT, UNSPECIFIED WHETHER MANIFESTATIONS FLUCTUATE: ICD-10-CM

## 2024-03-08 DIAGNOSIS — I25.10 CORONARY ARTERY CALCIFICATION: ICD-10-CM

## 2024-03-08 DIAGNOSIS — I25.84 CORONARY ARTERY CALCIFICATION: ICD-10-CM

## 2024-03-08 DIAGNOSIS — N18.32 CHRONIC KIDNEY DISEASE, STAGE 3B (CMD): ICD-10-CM

## 2024-03-08 DIAGNOSIS — E78.2 MIXED HYPERLIPIDEMIA: ICD-10-CM

## 2024-03-08 DIAGNOSIS — I1A.0 RESISTANT HYPERTENSION: Primary | ICD-10-CM

## 2024-03-08 DIAGNOSIS — I77.810 DILATED AORTIC ROOT (CMD): ICD-10-CM

## 2024-03-08 DIAGNOSIS — I69.30 HISTORY OF STROKE WITH RESIDUAL EFFECTS: ICD-10-CM

## 2024-03-08 ASSESSMENT — PATIENT HEALTH QUESTIONNAIRE - PHQ9
SUM OF ALL RESPONSES TO PHQ9 QUESTIONS 1 AND 2: 0
SUM OF ALL RESPONSES TO PHQ9 QUESTIONS 1 AND 2: 0
1. LITTLE INTEREST OR PLEASURE IN DOING THINGS: NOT AT ALL
CLINICAL INTERPRETATION OF PHQ2 SCORE: NO FURTHER SCREENING NEEDED
2. FEELING DOWN, DEPRESSED OR HOPELESS: NOT AT ALL

## 2024-03-11 ENCOUNTER — TELEPHONE (OUTPATIENT)
Dept: INTERNAL MEDICINE | Age: 84
End: 2024-03-11

## 2024-03-12 ENCOUNTER — OFFICE VISIT (OUTPATIENT)
Dept: INTERNAL MEDICINE | Age: 84
End: 2024-03-12

## 2024-03-12 VITALS
DIASTOLIC BLOOD PRESSURE: 86 MMHG | OXYGEN SATURATION: 96 % | HEART RATE: 64 BPM | TEMPERATURE: 95.3 F | SYSTOLIC BLOOD PRESSURE: 126 MMHG

## 2024-03-12 DIAGNOSIS — R13.10 DYSPHAGIA, UNSPECIFIED TYPE: Primary | ICD-10-CM

## 2024-03-12 PROCEDURE — 3074F SYST BP LT 130 MM HG: CPT | Performed by: NURSE PRACTITIONER

## 2024-03-12 PROCEDURE — 99213 OFFICE O/P EST LOW 20 MIN: CPT | Performed by: NURSE PRACTITIONER

## 2024-03-12 PROCEDURE — 3079F DIAST BP 80-89 MM HG: CPT | Performed by: NURSE PRACTITIONER

## 2024-03-13 ENCOUNTER — TELEPHONE (OUTPATIENT)
Dept: INTERNAL MEDICINE | Age: 84
End: 2024-03-13

## 2024-03-15 ASSESSMENT — ENCOUNTER SYMPTOMS
VOICE CHANGE: 0
CONSTIPATION: 0
ABDOMINAL PAIN: 0
FEVER: 0
CHILLS: 0
TROUBLE SWALLOWING: 1
RHINORRHEA: 0
DIARRHEA: 0
EYE DISCHARGE: 0
COUGH: 0
SORE THROAT: 0
SINUS PRESSURE: 0
VOMITING: 0
NAUSEA: 0

## 2024-03-18 ENCOUNTER — OFFICE VISIT (OUTPATIENT)
Dept: SURGERY | Facility: CLINIC | Age: 84
End: 2024-03-18
Payer: MEDICARE

## 2024-03-18 ENCOUNTER — LAB ENCOUNTER (OUTPATIENT)
Dept: LAB | Age: 84
End: 2024-03-18
Attending: UROLOGY
Payer: MEDICARE

## 2024-03-18 DIAGNOSIS — N32.81 OAB (OVERACTIVE BLADDER): ICD-10-CM

## 2024-03-18 DIAGNOSIS — R97.20 ELEVATED PSA: ICD-10-CM

## 2024-03-18 DIAGNOSIS — N40.1 BPH WITH OBSTRUCTION/LOWER URINARY TRACT SYMPTOMS: Primary | ICD-10-CM

## 2024-03-18 DIAGNOSIS — N13.8 BPH WITH OBSTRUCTION/LOWER URINARY TRACT SYMPTOMS: Primary | ICD-10-CM

## 2024-03-18 DIAGNOSIS — N52.9 ERECTILE DYSFUNCTION, UNSPECIFIED ERECTILE DYSFUNCTION TYPE: ICD-10-CM

## 2024-03-18 LAB
APPEARANCE: CLEAR
BILIRUBIN: NEGATIVE
GLUCOSE (URINE DIPSTICK): NEGATIVE MG/DL
KETONES (URINE DIPSTICK): NEGATIVE MG/DL
LEUKOCYTES: NEGATIVE
MULTISTIX LOT#: NORMAL NUMERIC
NITRITE, URINE: NEGATIVE
OCCULT BLOOD: NEGATIVE
PH, URINE: 6.5 (ref 4.5–8)
PROTEIN (URINE DIPSTICK): NEGATIVE MG/DL
PSA SERPL-MCNC: 0.77 NG/ML (ref ?–4)
SPECIFIC GRAVITY: 1.01 (ref 1–1.03)
URINE-COLOR: YELLOW
UROBILINOGEN,SEMI-QN: 0.2 MG/DL (ref 0–1.9)

## 2024-03-18 PROCEDURE — 51798 US URINE CAPACITY MEASURE: CPT | Performed by: UROLOGY

## 2024-03-18 PROCEDURE — 84153 ASSAY OF PSA TOTAL: CPT

## 2024-03-18 PROCEDURE — 36415 COLL VENOUS BLD VENIPUNCTURE: CPT

## 2024-03-18 RX ORDER — BISACODYL 10 MG
10 SUPPOSITORY, RECTAL RECTAL
COMMUNITY
Start: 2024-02-03

## 2024-03-18 RX ORDER — FINASTERIDE 5 MG/1
5 TABLET, FILM COATED ORAL DAILY
Qty: 90 TABLET | Refills: 5 | Status: SHIPPED | OUTPATIENT
Start: 2024-03-18

## 2024-03-18 RX ORDER — MIRABEGRON 25 MG/1
25 TABLET, FILM COATED, EXTENDED RELEASE ORAL DAILY
Qty: 90 TABLET | Refills: 5 | Status: SHIPPED | OUTPATIENT
Start: 2024-03-18 | End: 2025-03-13

## 2024-03-18 RX ORDER — TAMSULOSIN HYDROCHLORIDE 0.4 MG/1
0.4 CAPSULE ORAL DAILY
Qty: 90 CAPSULE | Refills: 5 | Status: SHIPPED | OUTPATIENT
Start: 2024-03-18

## 2024-03-20 ENCOUNTER — NURSE ONLY (OUTPATIENT)
Facility: LOCATION | Age: 84
End: 2024-03-20
Payer: MEDICARE

## 2024-03-20 ENCOUNTER — OFFICE VISIT (OUTPATIENT)
Facility: LOCATION | Age: 84
End: 2024-03-20
Payer: MEDICARE

## 2024-03-20 DIAGNOSIS — H93.293 ABNORMAL AUDITORY PERCEPTION OF BOTH EARS: Primary | ICD-10-CM

## 2024-03-20 DIAGNOSIS — J31.0 RHINITIS, NON-ALLERGIC: ICD-10-CM

## 2024-03-20 DIAGNOSIS — H92.01 OTALGIA OF RIGHT EAR: Primary | ICD-10-CM

## 2024-03-20 PROCEDURE — 31575 DIAGNOSTIC LARYNGOSCOPY: CPT | Performed by: OTOLARYNGOLOGY

## 2024-03-20 PROCEDURE — 99214 OFFICE O/P EST MOD 30 MIN: CPT | Performed by: OTOLARYNGOLOGY

## 2024-03-20 RX ORDER — IPRATROPIUM BROMIDE 42 UG/1
2 SPRAY, METERED NASAL 2 TIMES DAILY
Qty: 1 EACH | Refills: 5 | Status: SHIPPED | OUTPATIENT
Start: 2024-03-20

## 2024-03-20 NOTE — PROGRESS NOTES
Emmanuel Brower is a 84 year old male.   Chief Complaint   Patient presents with    Throat Problem     HPI:   84-year-old white male he has a severe Parkinson's and had a stroke 1 year ago.  Has been having right-sided otalgia for unknown reason.  Is confined to a wheelchair also has swallowing difficulties.  Hearing loss he has a sensorineural loss which we know about but there is been no worsening of the hearing.  Current Outpatient Medications   Medication Sig Dispense Refill    bisacodyl 10 MG Rectal Suppos Place 1 suppository (10 mg total) rectally daily as needed.      finasteride 5 MG Oral Tab Take 1 tablet (5 mg total) by mouth daily. 90 tablet 5    tamsulosin 0.4 MG Oral Cap Take 1 capsule (0.4 mg total) by mouth daily. 90 capsule 5    Mirabegron ER (MYRBETRIQ) 25 MG Oral Tablet 24 Hr Take 1 tablet (25 mg total) by mouth daily. 90 tablet 5    fluticasone propionate 50 MCG/ACT Nasal Suspension 2 sprays by Nasal route daily. 16 g 3    carvedilol 3.125 MG Oral Tab TAKE 1 TABLET BY MOUTH DAILY BEFORE AND BREAKFAST AS DIRECTED      carvedilol 6.25 MG Oral Tab Take by mouth 2 (two) times daily with meals.      hydrALAZINE 10 MG Oral Tab Take 1 tablet (10 mg total) by mouth nightly.      docusate sodium 100 MG Oral Cap Take 100 mg by mouth 2 (two) times daily.      Multiple Vitamins-Minerals (MULTIVITAMIN ADULT) Oral Chew Tab Chew 2 tablets by mouth daily.      Omeprazole 20 MG Oral Tablet Delayed Release Dispersible Take 20 mg by mouth daily.      risedronate 35 MG Oral Tab Take 1 tablet (35 mg total) by mouth every 7 days.      SODIUM FLUORIDE 5000 PPM 1.1 % Dental Paste       RYTARY 48. MG Oral Cap CR 1 capsule BID      polyethylene glycol, PEG 3350, 17 g Oral Powd Pack Take 17 g by mouth daily.      acetaminophen 500 MG Oral Tab       RYTARY 23.75-95 MG Oral Cap CR 1 tablet nightly.        Carbidopa-Levodopa ER  MG Oral Tab CR Take  tablets by mouth nightly. Take two a night      GOCOVRI 137  MG Oral Capsule SR 24 Hr Take 137 mg by mouth daily.        atorvastatin 10 MG Oral Tab Take 1 tablet (10 mg total) by mouth daily.      Carbidopa-Levodopa ER 36. MG Oral Cap CR Take 145 mg by mouth 3 (three) times daily.        ascorbic acid 250 MG Oral Tab Take 1 tablet (250 mg total) by mouth 2 (two) times daily.        Past Medical History:   Diagnosis Date    Blood in the stool     Constipation     Eye disease     Fatigue     Frequent use of laxatives     Hemorrhoids     High cholesterol     Irregular bowel habits     Wears glasses       Social History:  Social History     Socioeconomic History    Marital status:    Tobacco Use    Smoking status: Never    Smokeless tobacco: Never   Vaping Use    Vaping Use: Never used   Substance and Sexual Activity    Alcohol use: Yes     Alcohol/week: 3.0 standard drinks of alcohol     Types: 2 Glasses of wine, 1 Cans of beer per week    Drug use: No   Other Topics Concern    Caffeine Concern No    Exercise No      Past Surgical History:   Procedure Laterality Date    COLONOSCOPY           REVIEW OF SYSTEMS:   GENERAL HEALTH: feels well otherwise  GENERAL : denies fever, chills, sweats, weight loss, weight gain  SKIN: denies any unusual skin lesions or rashes  RESPIRATORY: denies shortness of breath with exertion  NEURO: denies headaches    EXAM:   There were no vitals taken for this visit.    System Pertinent findings Details   Constitutional  Overall appearance - Normal.   Head/Face  Facial features -- Normal. Skull - Normal.   Eyes  Pupils equal ,round ,react to light and accomidate   Ears  External Ear Right: Normal, Left: Normal. Canal - Right: Normal, Left: Normal. TM - Right: Normal left: Normal   Nose  External Nose, Normal, Septum -midline,Nasal Vault, clear there is increased drainage noted. Turbinates - Right: Boggy left: Boggy   Mouth/Throat  Lips/teeth/gums - Normal. Tonsils -1+ oropharynx - Normal.   Neck Exam  Inspection - Normal. Palpation -  Normal. Parotid gland - Normal. Thyroid gland -normal   Lymph Detail  Submental. Submandibular. Anterior cervical. Posterior cervical. Supraclavicular.   Patient has normal type a tympanograms today showing no evidence of fluid        Flexible Laryngoscopy Procedure Note (87390)    Due to inability for adequate examination of the larynx and need for magnification to perform the examination, endoscopy was performed.  Risks and benefits were discussed with patient/family and they have given verbal consent to proceed.    Pre-operative Diagnosis:   1. Otalgia of right ear    2. Rhinitis, non-allergic        Post-operative Diagnosis: Same    Procedure: Diagnostic flexible fiberoptic laryngoscopy    Anesthesia: Topical anesthetic [place]     Surgeon Yosef Yusuf MD    EBL: 0cc    Procedure Detail & Findings: The patient was topically anesthetized within the nose, bilaterally.  The flexible laryngoscope was placed through the nostrils bilaterally.  The findings within the nose midline.  The findings within the nasopharynx clear increased postnasal drip.  The findings within the hypopharynx no masses.  The findings within the larynx normal vocal cord mobility no masses or lesions      .    Condition: Stable    Complications: Patient tolerated the procedure well with no immediate complication.    Yosef Capma MD      ASSESSMENT AND PLAN:   1. Otalgia of right ear  Possibility this could be dental or due to sinus.  Treating with below    2. Rhinitis, non-allergic  Atrovent 0.06% nasal spray 2 sprays twice daily follow-up if not improved might consider CT scan of the sinuses      The patient indicates understanding of these issues and agrees to the plan.      Yosef Yusuf MD  3/20/2024  6:01 PM

## 2024-03-20 NOTE — PROGRESS NOTES
Emmanuel Brower was seen for a tympanogram today. Referred back to physician.    Elsy Worrell M.A., CCC-A

## 2024-04-03 ENCOUNTER — LAB SERVICES (OUTPATIENT)
Dept: LAB | Age: 84
End: 2024-04-03

## 2024-04-03 DIAGNOSIS — E78.2 MIXED HYPERLIPIDEMIA: ICD-10-CM

## 2024-04-03 DIAGNOSIS — I51.89 DIASTOLIC DYSFUNCTION: ICD-10-CM

## 2024-04-03 DIAGNOSIS — I1A.0 RESISTANT HYPERTENSION: ICD-10-CM

## 2024-04-03 LAB
BASOPHILS # BLD: 0.1 K/MCL (ref 0–0.3)
BASOPHILS NFR BLD: 1 %
DEPRECATED RDW RBC: 46.8 FL (ref 39–50)
EOSINOPHIL # BLD: 0.5 K/MCL (ref 0–0.5)
EOSINOPHIL NFR BLD: 6 %
ERYTHROCYTE [DISTWIDTH] IN BLOOD: 13.4 % (ref 11–15)
HCT VFR BLD CALC: 48 % (ref 39–51)
HGB BLD-MCNC: 16.4 G/DL (ref 13–17)
IMM GRANULOCYTES # BLD AUTO: 0 K/MCL (ref 0–0.2)
IMM GRANULOCYTES # BLD: 0 %
LYMPHOCYTES # BLD: 2.2 K/MCL (ref 1–4)
LYMPHOCYTES NFR BLD: 29 %
MCH RBC QN AUTO: 32.4 PG (ref 26–34)
MCHC RBC AUTO-ENTMCNC: 34.2 G/DL (ref 32–36.5)
MCV RBC AUTO: 94.9 FL (ref 78–100)
MONOCYTES # BLD: 0.7 K/MCL (ref 0.3–0.9)
MONOCYTES NFR BLD: 9 %
NEUTROPHILS # BLD: 4.2 K/MCL (ref 1.8–7.7)
NEUTROPHILS NFR BLD: 55 %
NRBC BLD MANUAL-RTO: 0 /100 WBC
PLATELET # BLD AUTO: 213 K/MCL (ref 140–450)
RBC # BLD: 5.06 MIL/MCL (ref 4.5–5.9)
WBC # BLD: 7.7 K/MCL (ref 4.2–11)

## 2024-04-03 PROCEDURE — 85025 COMPLETE CBC W/AUTO DIFF WBC: CPT | Performed by: INTERNAL MEDICINE

## 2024-04-03 PROCEDURE — 84443 ASSAY THYROID STIM HORMONE: CPT | Performed by: INTERNAL MEDICINE

## 2024-04-03 PROCEDURE — 80076 HEPATIC FUNCTION PANEL: CPT | Performed by: INTERNAL MEDICINE

## 2024-04-03 PROCEDURE — 80061 LIPID PANEL: CPT | Performed by: INTERNAL MEDICINE

## 2024-04-03 PROCEDURE — 80048 BASIC METABOLIC PNL TOTAL CA: CPT | Performed by: INTERNAL MEDICINE

## 2024-04-03 PROCEDURE — 36415 COLL VENOUS BLD VENIPUNCTURE: CPT | Performed by: INTERNAL MEDICINE

## 2024-04-04 LAB
ALBUMIN SERPL-MCNC: 4.4 G/DL (ref 3.6–5.1)
ALP SERPL-CCNC: 66 UNITS/L (ref 45–117)
ALT SERPL-CCNC: 10 UNITS/L
ANION GAP SERPL CALC-SCNC: 16 MMOL/L (ref 7–19)
AST SERPL-CCNC: 13 UNITS/L
BILIRUB CONJ SERPL-MCNC: 0.2 MG/DL (ref 0–0.2)
BILIRUB SERPL-MCNC: 0.8 MG/DL (ref 0.2–1)
BUN SERPL-MCNC: 24 MG/DL (ref 6–20)
BUN/CREAT SERPL: 17 (ref 7–25)
CALCIUM SERPL-MCNC: 9.4 MG/DL (ref 8.4–10.2)
CHLORIDE SERPL-SCNC: 105 MMOL/L (ref 97–110)
CHOLEST SERPL-MCNC: 145 MG/DL
CHOLEST/HDLC SERPL: 3.8 {RATIO}
CO2 SERPL-SCNC: 24 MMOL/L (ref 21–32)
CREAT SERPL-MCNC: 1.43 MG/DL (ref 0.67–1.17)
EGFRCR SERPLBLD CKD-EPI 2021: 49 ML/MIN/{1.73_M2}
FASTING DURATION TIME PATIENT: ABNORMAL H
GLUCOSE SERPL-MCNC: 89 MG/DL (ref 70–99)
HDLC SERPL-MCNC: 38 MG/DL
LDLC SERPL CALC-MCNC: 65 MG/DL
NONHDLC SERPL-MCNC: 107 MG/DL
POTASSIUM SERPL-SCNC: 4.4 MMOL/L (ref 3.4–5.1)
PROT SERPL-MCNC: 7.2 G/DL (ref 6.4–8.2)
SODIUM SERPL-SCNC: 141 MMOL/L (ref 135–145)
TRIGL SERPL-MCNC: 208 MG/DL
TSH SERPL-ACNC: 1 MCUNITS/ML (ref 0.35–5)

## 2024-04-11 ENCOUNTER — APPOINTMENT (OUTPATIENT)
Dept: INTERNAL MEDICINE | Age: 84
End: 2024-04-11

## 2024-04-11 VITALS
DIASTOLIC BLOOD PRESSURE: 70 MMHG | TEMPERATURE: 97.5 F | HEART RATE: 72 BPM | OXYGEN SATURATION: 97 % | SYSTOLIC BLOOD PRESSURE: 104 MMHG | BODY MASS INDEX: 28.16 KG/M2 | RESPIRATION RATE: 18 BRPM | HEIGHT: 65 IN | WEIGHT: 169 LBS

## 2024-04-11 DIAGNOSIS — M80.00XS OSTEOPOROSIS WITH CURRENT PATHOLOGICAL FRACTURE, UNSPECIFIED OSTEOPOROSIS TYPE, SEQUELA: ICD-10-CM

## 2024-04-11 DIAGNOSIS — E78.49 OTHER HYPERLIPIDEMIA: ICD-10-CM

## 2024-04-11 DIAGNOSIS — S22.42XS CLOSED FRACTURE OF MULTIPLE RIBS OF LEFT SIDE, SEQUELA: Primary | ICD-10-CM

## 2024-04-11 DIAGNOSIS — N18.30 STAGE 3 CHRONIC KIDNEY DISEASE, UNSPECIFIED WHETHER STAGE 3A OR 3B CKD (CMD): ICD-10-CM

## 2024-04-11 DIAGNOSIS — I10 PRIMARY HYPERTENSION: ICD-10-CM

## 2024-04-11 ASSESSMENT — PATIENT HEALTH QUESTIONNAIRE - PHQ9
SUM OF ALL RESPONSES TO PHQ9 QUESTIONS 1 AND 2: 0
2. FEELING DOWN, DEPRESSED OR HOPELESS: NOT AT ALL
SUM OF ALL RESPONSES TO PHQ9 QUESTIONS 1 AND 2: 0
1. LITTLE INTEREST OR PLEASURE IN DOING THINGS: NOT AT ALL
CLINICAL INTERPRETATION OF PHQ2 SCORE: NO FURTHER SCREENING NEEDED

## 2024-04-26 ENCOUNTER — TELEPHONE (OUTPATIENT)
Facility: LOCATION | Age: 84
End: 2024-04-26

## 2024-04-26 NOTE — TELEPHONE ENCOUNTER
Pt's wife called and canceled their appointment on Wednesday May 1st. Pt's wife would like writer to relay that Pt has improved.

## 2024-05-03 RX ORDER — RISEDRONATE SODIUM 35 MG/1
35 TABLET, FILM COATED ORAL
Qty: 4 TABLET | Refills: 11 | Status: SHIPPED | OUTPATIENT
Start: 2024-05-03

## 2024-05-26 DIAGNOSIS — I51.89 DIASTOLIC DYSFUNCTION: ICD-10-CM

## 2024-05-26 DIAGNOSIS — I10 SUPINE HYPERTENSION: ICD-10-CM

## 2024-05-28 RX ORDER — HYDRALAZINE HYDROCHLORIDE 10 MG/1
TABLET, FILM COATED ORAL
Qty: 90 TABLET | Refills: 3 | Status: SHIPPED | OUTPATIENT
Start: 2024-05-28

## 2024-07-09 ENCOUNTER — APPOINTMENT (OUTPATIENT)
Dept: CARDIOLOGY | Age: 84
End: 2024-07-09

## 2024-07-09 VITALS
HEART RATE: 66 BPM | OXYGEN SATURATION: 97 % | BODY MASS INDEX: 27.99 KG/M2 | WEIGHT: 168 LBS | SYSTOLIC BLOOD PRESSURE: 130 MMHG | DIASTOLIC BLOOD PRESSURE: 72 MMHG | HEIGHT: 65 IN

## 2024-07-09 DIAGNOSIS — I1A.0 RESISTANT HYPERTENSION: Primary | ICD-10-CM

## 2024-07-09 DIAGNOSIS — I25.84 CORONARY ARTERY CALCIFICATION: ICD-10-CM

## 2024-07-09 DIAGNOSIS — I77.810 DILATED AORTIC ROOT (CMD): ICD-10-CM

## 2024-07-09 DIAGNOSIS — I25.10 CORONARY ARTERY CALCIFICATION: ICD-10-CM

## 2024-07-09 DIAGNOSIS — Z86.73 HISTORY OF ISCHEMIC STROKE: ICD-10-CM

## 2024-07-09 DIAGNOSIS — G20.A1 PARKINSON'S DISEASE, UNSPECIFIED WHETHER DYSKINESIA PRESENT, UNSPECIFIED WHETHER MANIFESTATIONS FLUCTUATE  (CMD): ICD-10-CM

## 2024-07-09 DIAGNOSIS — N28.9 RENAL INSUFFICIENCY: ICD-10-CM

## 2024-07-09 DIAGNOSIS — E78.2 MIXED HYPERLIPIDEMIA: ICD-10-CM

## 2024-07-09 PROCEDURE — 3075F SYST BP GE 130 - 139MM HG: CPT

## 2024-07-09 PROCEDURE — 3078F DIAST BP <80 MM HG: CPT

## 2024-07-09 PROCEDURE — 99214 OFFICE O/P EST MOD 30 MIN: CPT

## 2024-07-23 ENCOUNTER — TELEPHONE (OUTPATIENT)
Facility: LOCATION | Age: 84
End: 2024-07-23

## 2024-07-23 RX ORDER — IPRATROPIUM BROMIDE 42 UG/1
SPRAY, METERED NASAL
Qty: 60 ML | Refills: 0 | OUTPATIENT
Start: 2024-07-23

## 2024-07-23 RX ORDER — IPRATROPIUM BROMIDE 42 UG/1
2 SPRAY, METERED NASAL 2 TIMES DAILY
Qty: 1 EACH | Refills: 5 | Status: SHIPPED | OUTPATIENT
Start: 2024-07-23

## 2024-07-23 NOTE — TELEPHONE ENCOUNTER
Requested Prescriptions     Pending Prescriptions Disp Refills    IPRATROPIUM 0.06 % Nasal Solution [Pharmacy Med Name: IPRATROPIUM 0.06% SHANA SP 15ML (165)] 60 mL 0     Sig: USE 2 SPRAYS NASALLY IN THE MORNING AND 2 SPRAYS BEFORE BEDTIME       FILLED- 3/20/24  LOV- 3/20/24    No future appointments.

## 2024-08-12 ENCOUNTER — TELEPHONE (OUTPATIENT)
Dept: FAMILY MEDICINE | Age: 84
End: 2024-08-12

## 2024-08-12 SDOH — ECONOMIC STABILITY: FOOD INSECURITY: WITHIN THE PAST 12 MONTHS, THE FOOD YOU BOUGHT JUST DIDN'T LAST AND YOU DIDN'T HAVE MONEY TO GET MORE.: NEVER TRUE

## 2024-08-12 SDOH — ECONOMIC STABILITY: TRANSPORTATION INSECURITY
IN THE PAST 12 MONTHS, HAS LACK OF RELIABLE TRANSPORTATION KEPT YOU FROM MEDICAL APPOINTMENTS, MEETINGS, WORK OR FROM GETTING THINGS NEEDED FOR DAILY LIVING?: NO

## 2024-08-12 SDOH — ECONOMIC STABILITY: HOUSING INSECURITY: WHAT IS YOUR LIVING SITUATION TODAY?: I HAVE A STEADY PLACE TO LIVE

## 2024-08-12 SDOH — HEALTH STABILITY: PHYSICAL HEALTH: ON AVERAGE, HOW MANY DAYS PER WEEK DO YOU ENGAGE IN MODERATE TO STRENUOUS EXERCISE (LIKE A BRISK WALK)?: 3 DAYS

## 2024-08-12 SDOH — SOCIAL STABILITY: SOCIAL NETWORK
HOW OFTEN DO YOU SEE OR TALK TO PEOPLE THAT YOU CARE ABOUT AND FEEL CLOSE TO? (FOR EXAMPLE: TALKING TO FRIENDS ON THE PHONE, VISITING FRIENDS OR FAMILY, GOING TO CHURCH OR CLUB MEETINGS): 5 OR MORE TIMES A WEEK

## 2024-08-12 SDOH — ECONOMIC STABILITY: HOUSING INSECURITY: DO YOU HAVE PROBLEMS WITH ANY OF THE FOLLOWING?: NONE OF THE ABOVE

## 2024-08-12 SDOH — HEALTH STABILITY: PHYSICAL HEALTH: ON AVERAGE, HOW MANY MINUTES DO YOU ENGAGE IN EXERCISE AT THIS LEVEL?: 30 MIN

## 2024-08-12 SDOH — ECONOMIC STABILITY: GENERAL

## 2024-08-12 ASSESSMENT — SOCIAL DETERMINANTS OF HEALTH (SDOH): IN THE PAST 12 MONTHS, HAS THE ELECTRIC, GAS, OIL, OR WATER COMPANY THREATENED TO SHUT OFF SERVICE IN YOUR HOME?: NO

## 2024-08-12 ASSESSMENT — PATIENT HEALTH QUESTIONNAIRE - PHQ9
2. FEELING DOWN, DEPRESSED OR HOPELESS: SEVERAL DAYS
CLINICAL INTERPRETATION OF PHQ2 SCORE: NO FURTHER SCREENING NEEDED
CLINICAL INTERPRETATION OF PHQ2 SCORE: 2
1. LITTLE INTEREST OR PLEASURE IN DOING THINGS: SEVERAL DAYS
SUM OF ALL RESPONSES TO PHQ9 QUESTIONS 1 AND 2: 2

## 2024-08-12 ASSESSMENT — LIFESTYLE VARIABLES
HOW OFTEN DO YOU HAVE A DRINK CONTAINING ALCOHOL: 2 TO 4 TIMES PER MONTH
AUDIT-C TOTAL SCORE: 2
HOW MANY STANDARD DRINKS CONTAINING ALCOHOL DO YOU HAVE ON A TYPICAL DAY: 0,1 OR 2

## 2024-08-15 ENCOUNTER — APPOINTMENT (OUTPATIENT)
Dept: FAMILY MEDICINE | Age: 84
End: 2024-08-15

## 2024-08-15 VITALS
TEMPERATURE: 97.4 F | WEIGHT: 168 LBS | SYSTOLIC BLOOD PRESSURE: 108 MMHG | BODY MASS INDEX: 27.99 KG/M2 | DIASTOLIC BLOOD PRESSURE: 76 MMHG | HEART RATE: 66 BPM | HEIGHT: 65 IN

## 2024-08-15 DIAGNOSIS — N18.32 CHRONIC KIDNEY DISEASE, STAGE 3B  (CMD): ICD-10-CM

## 2024-08-15 DIAGNOSIS — G24.4 OROFACIAL DYSKINESIA: ICD-10-CM

## 2024-08-15 DIAGNOSIS — Z00.00 ENCOUNTER FOR ANNUAL WELLNESS VISIT (AWV) IN MEDICARE PATIENT: ICD-10-CM

## 2024-08-15 DIAGNOSIS — F09 COGNITIVE DYSFUNCTION: ICD-10-CM

## 2024-08-15 DIAGNOSIS — G20.B1 PARKINSON'S DISEASE WITH DYSKINESIA WITHOUT FLUCTUATING MANIFESTATIONS  (CMD): ICD-10-CM

## 2024-08-15 DIAGNOSIS — E78.5 HYPERLIPIDEMIA, UNSPECIFIED HYPERLIPIDEMIA TYPE: ICD-10-CM

## 2024-08-15 DIAGNOSIS — I10 ESSENTIAL HYPERTENSION: ICD-10-CM

## 2024-08-15 DIAGNOSIS — Z00.00 ENCOUNTER FOR PREVENTIVE HEALTH EXAMINATION: Primary | ICD-10-CM

## 2024-08-15 PROBLEM — I13.0 HYPERTENSIVE HEART AND CHRONIC KIDNEY DISEASE WITH HEART FAILURE AND STAGE 1 THROUGH STAGE 4 CHRONIC KIDNEY DISEASE, OR UNSPECIFIED CHRONIC KIDNEY DISEASE  (CMD): Status: RESOLVED | Noted: 2022-10-09 | Resolved: 2024-08-15

## 2024-08-15 RX ORDER — IPRATROPIUM BROMIDE 42 UG/1
2 SPRAY, METERED NASAL 3 TIMES DAILY
COMMUNITY
Start: 2024-07-23

## 2024-08-15 ASSESSMENT — PATIENT HEALTH QUESTIONNAIRE - PHQ9
2. FEELING DOWN, DEPRESSED OR HOPELESS: NOT AT ALL
1. LITTLE INTEREST OR PLEASURE IN DOING THINGS: NOT AT ALL
SUM OF ALL RESPONSES TO PHQ9 QUESTIONS 1 AND 2: 0
CLINICAL INTERPRETATION OF PHQ2 SCORE: NO FURTHER SCREENING NEEDED
SUM OF ALL RESPONSES TO PHQ9 QUESTIONS 1 AND 2: 0

## 2024-08-15 ASSESSMENT — COGNITIVE AND FUNCTIONAL STATUS - GENERAL
BECAUSE OF A PHYSICAL, MENTAL, OR EMOTIONAL CONDITION, DO YOU HAVE DIFFICULTY DOING ERRANDS ALONE: YES
DO YOU HAVE SERIOUS DIFFICULTY WALKING OR CLIMBING STAIRS: YES
DO YOU HAVE DIFFICULTY DRESSING OR BATHING: YES
BECAUSE OF A PHYSICAL, MENTAL, OR EMOTIONAL CONDITION, DO YOU HAVE SERIOUS DIFFICULTY CONCENTRATING, REMEMBERING OR MAKING DECISIONS: YES

## 2024-08-26 RX ORDER — CARVEDILOL 6.25 MG/1
6.25 TABLET ORAL NIGHTLY
Qty: 90 TABLET | Refills: 3 | Status: SHIPPED | OUTPATIENT
Start: 2024-08-26

## 2024-09-03 ENCOUNTER — E-ADVICE (OUTPATIENT)
Dept: FAMILY MEDICINE | Age: 84
End: 2024-09-03

## 2024-09-03 DIAGNOSIS — G20.B1 PARKINSON'S DISEASE WITH DYSKINESIA WITHOUT FLUCTUATING MANIFESTATIONS  (CMD): Primary | ICD-10-CM

## 2024-11-04 RX ORDER — ATORVASTATIN CALCIUM 10 MG/1
10 TABLET, FILM COATED ORAL DAILY
Qty: 90 TABLET | Refills: 1 | Status: SHIPPED | OUTPATIENT
Start: 2024-11-04

## 2024-11-08 ENCOUNTER — APPOINTMENT (OUTPATIENT)
Dept: CARDIOLOGY | Age: 84
End: 2024-11-08

## 2024-11-08 VITALS
HEART RATE: 63 BPM | HEIGHT: 65 IN | SYSTOLIC BLOOD PRESSURE: 116 MMHG | DIASTOLIC BLOOD PRESSURE: 76 MMHG | OXYGEN SATURATION: 97 % | RESPIRATION RATE: 18 BRPM | BODY MASS INDEX: 27.96 KG/M2

## 2024-11-08 DIAGNOSIS — G20.A1 PARKINSON'S DISEASE, UNSPECIFIED WHETHER DYSKINESIA PRESENT, UNSPECIFIED WHETHER MANIFESTATIONS FLUCTUATE  (CMD): ICD-10-CM

## 2024-11-08 DIAGNOSIS — R93.1 AGATSTON CORONARY ARTERY CALCIUM SCORE GREATER THAN 400: ICD-10-CM

## 2024-11-08 DIAGNOSIS — I25.10 CORONARY ARTERY CALCIFICATION: ICD-10-CM

## 2024-11-08 DIAGNOSIS — I65.23 BILATERAL CAROTID ARTERY STENOSIS: ICD-10-CM

## 2024-11-08 DIAGNOSIS — I10 SUPINE HYPERTENSION: ICD-10-CM

## 2024-11-08 DIAGNOSIS — I69.30 HISTORY OF STROKE WITH RESIDUAL EFFECTS: ICD-10-CM

## 2024-11-08 DIAGNOSIS — I1A.0 RESISTANT HYPERTENSION: Primary | ICD-10-CM

## 2024-11-08 DIAGNOSIS — Z86.73 HISTORY OF ISCHEMIC STROKE: ICD-10-CM

## 2024-11-08 DIAGNOSIS — E78.2 MIXED HYPERLIPIDEMIA: ICD-10-CM

## 2024-11-08 RX ORDER — CARVEDILOL 3.12 MG/1
TABLET ORAL
Qty: 110 TABLET | Refills: 5 | Status: SHIPPED | OUTPATIENT
Start: 2024-11-08

## 2024-12-11 ENCOUNTER — TELEPHONE (OUTPATIENT)
Dept: FAMILY MEDICINE | Age: 84
End: 2024-12-11

## 2024-12-11 DIAGNOSIS — R39.89 SUSPECTED UTI: Primary | ICD-10-CM

## 2024-12-12 ENCOUNTER — APPOINTMENT (OUTPATIENT)
Dept: LAB | Age: 84
End: 2024-12-12

## 2024-12-12 ENCOUNTER — LAB SERVICES (OUTPATIENT)
Dept: LAB | Age: 84
End: 2024-12-12

## 2024-12-12 DIAGNOSIS — R39.89 SUSPECTED UTI: ICD-10-CM

## 2024-12-12 LAB
APPEARANCE UR: CLEAR
BILIRUB UR QL STRIP: NEGATIVE
COLOR UR: YELLOW
GLUCOSE UR STRIP-MCNC: NEGATIVE MG/DL
HGB UR QL STRIP: NEGATIVE
KETONES UR STRIP-MCNC: NEGATIVE MG/DL
LEUKOCYTE ESTERASE UR QL STRIP: NEGATIVE
NITRITE UR QL STRIP: NEGATIVE
PH UR STRIP: 5.5 [PH] (ref 5–7)
PROT UR STRIP-MCNC: NEGATIVE MG/DL
SP GR UR STRIP: 1.02 (ref 1–1.03)
UROBILINOGEN UR STRIP-MCNC: 0.2 MG/DL

## 2024-12-12 PROCEDURE — 87086 URINE CULTURE/COLONY COUNT: CPT | Performed by: CLINICAL MEDICAL LABORATORY

## 2024-12-12 PROCEDURE — 81003 URINALYSIS AUTO W/O SCOPE: CPT | Performed by: INTERNAL MEDICINE

## 2024-12-13 LAB — BACTERIA UR CULT: NO GROWTH

## 2025-01-14 ENCOUNTER — APPOINTMENT (OUTPATIENT)
Dept: CARDIOLOGY | Age: 85
End: 2025-01-14
Attending: INTERNAL MEDICINE

## 2025-01-14 DIAGNOSIS — I69.30 HISTORY OF STROKE WITH RESIDUAL EFFECTS: ICD-10-CM

## 2025-01-14 DIAGNOSIS — E78.2 MIXED HYPERLIPIDEMIA: ICD-10-CM

## 2025-01-14 DIAGNOSIS — I65.23 BILATERAL CAROTID ARTERY STENOSIS: ICD-10-CM

## 2025-01-14 PROCEDURE — 93880 EXTRACRANIAL BILAT STUDY: CPT | Performed by: INTERNAL MEDICINE

## 2025-01-20 ENCOUNTER — E-ADVICE (OUTPATIENT)
Dept: CARDIOLOGY | Age: 85
End: 2025-01-20

## 2025-01-20 ENCOUNTER — TELEPHONE (OUTPATIENT)
Dept: CARDIOLOGY | Age: 85
End: 2025-01-20

## 2025-02-20 ENCOUNTER — APPOINTMENT (OUTPATIENT)
Dept: FAMILY MEDICINE | Age: 85
End: 2025-02-20

## 2025-02-20 VITALS
TEMPERATURE: 97.5 F | WEIGHT: 158 LBS | BODY MASS INDEX: 26.33 KG/M2 | HEIGHT: 65 IN | SYSTOLIC BLOOD PRESSURE: 118 MMHG | DIASTOLIC BLOOD PRESSURE: 68 MMHG | HEART RATE: 68 BPM

## 2025-02-20 DIAGNOSIS — F09 COGNITIVE DYSFUNCTION: ICD-10-CM

## 2025-02-20 DIAGNOSIS — K59.01 SLOW TRANSIT CONSTIPATION: ICD-10-CM

## 2025-02-20 DIAGNOSIS — E78.5 HYPERLIPIDEMIA, UNSPECIFIED HYPERLIPIDEMIA TYPE: ICD-10-CM

## 2025-02-20 DIAGNOSIS — Z23 ENCOUNTER FOR IMMUNIZATION: ICD-10-CM

## 2025-02-20 DIAGNOSIS — G20.B1 PARKINSON'S DISEASE WITH DYSKINESIA WITHOUT FLUCTUATING MANIFESTATIONS  (CMD): ICD-10-CM

## 2025-02-20 DIAGNOSIS — G24.4 OROFACIAL DYSKINESIA: ICD-10-CM

## 2025-02-20 DIAGNOSIS — M81.0 AGE-RELATED OSTEOPOROSIS WITHOUT CURRENT PATHOLOGICAL FRACTURE: ICD-10-CM

## 2025-02-20 DIAGNOSIS — I10 ESSENTIAL HYPERTENSION: Primary | ICD-10-CM

## 2025-02-20 DIAGNOSIS — N18.32 CHRONIC KIDNEY DISEASE, STAGE 3B  (CMD): ICD-10-CM

## 2025-02-20 RX ORDER — ATORVASTATIN CALCIUM 10 MG/1
10 TABLET, FILM COATED ORAL DAILY
Qty: 90 TABLET | Refills: 3 | Status: SHIPPED | OUTPATIENT
Start: 2025-02-20

## 2025-02-20 RX ORDER — RISEDRONATE SODIUM 35 MG/1
35 TABLET, FILM COATED ORAL
Qty: 12 TABLET | Refills: 3 | Status: SHIPPED | OUTPATIENT
Start: 2025-02-20

## 2025-02-20 ASSESSMENT — PATIENT HEALTH QUESTIONNAIRE - PHQ9
2. FEELING DOWN, DEPRESSED OR HOPELESS: NOT AT ALL
SUM OF ALL RESPONSES TO PHQ9 QUESTIONS 1 AND 2: 0
SUM OF ALL RESPONSES TO PHQ9 QUESTIONS 1 AND 2: 0
1. LITTLE INTEREST OR PLEASURE IN DOING THINGS: NOT AT ALL
CLINICAL INTERPRETATION OF PHQ2 SCORE: NO FURTHER SCREENING NEEDED

## 2025-03-10 ENCOUNTER — LAB SERVICES (OUTPATIENT)
Dept: LAB | Age: 85
End: 2025-03-10

## 2025-03-10 DIAGNOSIS — E78.5 HYPERLIPIDEMIA, UNSPECIFIED HYPERLIPIDEMIA TYPE: ICD-10-CM

## 2025-03-10 DIAGNOSIS — I10 ESSENTIAL HYPERTENSION: ICD-10-CM

## 2025-03-10 DIAGNOSIS — N18.32 CHRONIC KIDNEY DISEASE, STAGE 3B  (CMD): ICD-10-CM

## 2025-03-10 LAB
ALBUMIN SERPL-MCNC: 4.3 G/DL (ref 3.4–5)
ALBUMIN/GLOB SERPL: 1.5 {RATIO} (ref 1–2.4)
ALP SERPL-CCNC: 64 UNITS/L (ref 45–117)
ALT SERPL-CCNC: 16 UNITS/L
ANION GAP SERPL CALC-SCNC: 19 MMOL/L (ref 7–19)
AST SERPL-CCNC: 20 UNITS/L
BASOPHILS # BLD: 0.1 K/MCL (ref 0–0.3)
BASOPHILS NFR BLD: 1 %
BILIRUB SERPL-MCNC: 0.7 MG/DL (ref 0.2–1)
BUN SERPL-MCNC: 27 MG/DL (ref 6–20)
BUN/CREAT SERPL: 17 (ref 7–25)
CALCIUM SERPL-MCNC: 9.6 MG/DL (ref 8.4–10.2)
CHLORIDE SERPL-SCNC: 102 MMOL/L (ref 97–110)
CHOLEST SERPL-MCNC: 159 MG/DL
CHOLEST/HDLC SERPL: 4.2 {RATIO}
CO2 SERPL-SCNC: 26 MMOL/L (ref 21–32)
CREAT SERPL-MCNC: 1.62 MG/DL (ref 0.67–1.17)
CREAT UR-MCNC: 46.1 MG/DL
DEPRECATED RDW RBC: 46.3 FL (ref 39–50)
EGFRCR SERPLBLD CKD-EPI 2021: 42 ML/MIN/{1.73_M2}
EOSINOPHIL # BLD: 0.4 K/MCL (ref 0–0.5)
EOSINOPHIL NFR BLD: 5 %
ERYTHROCYTE [DISTWIDTH] IN BLOOD: 13.2 % (ref 11–15)
FASTING DURATION TIME PATIENT: ABNORMAL H
GLOBULIN SER-MCNC: 2.8 G/DL (ref 2–4)
GLUCOSE SERPL-MCNC: 97 MG/DL (ref 70–99)
HCT VFR BLD CALC: 47.4 % (ref 39–51)
HDLC SERPL-MCNC: 38 MG/DL
HGB BLD-MCNC: 16.2 G/DL (ref 13–17)
IMM GRANULOCYTES # BLD AUTO: 0 K/MCL (ref 0–0.2)
IMM GRANULOCYTES # BLD: 0 %
LDLC SERPL CALC-MCNC: 78 MG/DL
LYMPHOCYTES # BLD: 1.6 K/MCL (ref 1–4)
LYMPHOCYTES NFR BLD: 18 %
MCH RBC QN AUTO: 32.7 PG (ref 26–34)
MCHC RBC AUTO-ENTMCNC: 34.2 G/DL (ref 32–36.5)
MCV RBC AUTO: 95.6 FL (ref 78–100)
MICROALBUMIN UR-MCNC: 0.64 MG/DL
MICROALBUMIN/CREAT UR: 13.9 MG/G
MONOCYTES # BLD: 0.8 K/MCL (ref 0.3–0.9)
MONOCYTES NFR BLD: 9 %
NEUTROPHILS # BLD: 5.8 K/MCL (ref 1.8–7.7)
NEUTROPHILS NFR BLD: 67 %
NONHDLC SERPL-MCNC: 121 MG/DL
NRBC BLD MANUAL-RTO: 0 /100 WBC
PLATELET # BLD AUTO: 229 K/MCL (ref 140–450)
POTASSIUM SERPL-SCNC: 4.5 MMOL/L (ref 3.4–5.1)
PROT SERPL-MCNC: 7.1 G/DL (ref 6.4–8.2)
RBC # BLD: 4.96 MIL/MCL (ref 4.5–5.9)
SODIUM SERPL-SCNC: 142 MMOL/L (ref 135–145)
TRIGL SERPL-MCNC: 213 MG/DL
WBC # BLD: 8.6 K/MCL (ref 4.2–11)

## 2025-03-10 PROCEDURE — 36415 COLL VENOUS BLD VENIPUNCTURE: CPT | Performed by: FAMILY MEDICINE

## 2025-03-10 PROCEDURE — 80053 COMPREHEN METABOLIC PANEL: CPT | Performed by: INTERNAL MEDICINE

## 2025-03-10 PROCEDURE — 85025 COMPLETE CBC W/AUTO DIFF WBC: CPT | Performed by: INTERNAL MEDICINE

## 2025-03-10 PROCEDURE — 82043 UR ALBUMIN QUANTITATIVE: CPT | Performed by: INTERNAL MEDICINE

## 2025-03-10 PROCEDURE — 80061 LIPID PANEL: CPT | Performed by: INTERNAL MEDICINE

## 2025-03-10 PROCEDURE — 82570 ASSAY OF URINE CREATININE: CPT | Performed by: INTERNAL MEDICINE

## 2025-04-14 ENCOUNTER — TELEPHONE (OUTPATIENT)
Dept: SURGERY | Facility: CLINIC | Age: 85
End: 2025-04-14

## 2025-04-14 NOTE — TELEPHONE ENCOUNTER
Patient wife calling would like to make sure if Patient needs to come for a follow up in may?Please advise

## 2025-04-15 NOTE — TELEPHONE ENCOUNTER
LOV: 03/18/24-due for annual appt   Called wife who is asking if pt should see Dr. Young or Marva Dennis PA-C.   I informed pt's wife, it is up to them on which provider to see and if pt wanted to be seen sooner can see Marva Dennis PA-C.   Wife stated no need for sooner appt. Advised to keep appt with Dr. Young.   Wife verbalized understanding     This encounter is now closed

## 2025-04-27 ENCOUNTER — E-ADVICE (OUTPATIENT)
Dept: FAMILY MEDICINE | Age: 85
End: 2025-04-27

## 2025-04-30 ENCOUNTER — V-VISIT (OUTPATIENT)
Dept: FAMILY MEDICINE | Age: 85
End: 2025-04-30

## 2025-04-30 DIAGNOSIS — M10.9 ACUTE GOUT INVOLVING TOE OF LEFT FOOT, UNSPECIFIED CAUSE: Primary | ICD-10-CM

## 2025-04-30 DIAGNOSIS — G20.B1 PARKINSON'S DISEASE WITH DYSKINESIA WITHOUT FLUCTUATING MANIFESTATIONS  (CMD): ICD-10-CM

## 2025-04-30 DIAGNOSIS — N18.32 CHRONIC KIDNEY DISEASE, STAGE 3B  (CMD): ICD-10-CM

## 2025-04-30 RX ORDER — PREDNISONE 20 MG/1
40 TABLET ORAL
Qty: 10 TABLET | Refills: 0 | Status: SHIPPED | OUTPATIENT
Start: 2025-04-30 | End: 2025-05-05

## 2025-04-30 ASSESSMENT — PATIENT HEALTH QUESTIONNAIRE - PHQ9
SUM OF ALL RESPONSES TO PHQ9 QUESTIONS 1 AND 2: 0
SUM OF ALL RESPONSES TO PHQ9 QUESTIONS 1 AND 2: 0
2. FEELING DOWN, DEPRESSED OR HOPELESS: NOT AT ALL
1. LITTLE INTEREST OR PLEASURE IN DOING THINGS: NOT AT ALL
CLINICAL INTERPRETATION OF PHQ2 SCORE: NO FURTHER SCREENING NEEDED

## 2025-04-30 ASSESSMENT — ENCOUNTER SYMPTOMS: FEVER: 0

## 2025-05-09 ENCOUNTER — APPOINTMENT (OUTPATIENT)
Dept: CARDIOLOGY | Age: 85
End: 2025-05-09

## 2025-05-12 NOTE — PROGRESS NOTES
HPI:     Emmanuel Brower is a 85 year old male with a PMH of Parkinson's, CVA, HL, IBS    Following for:  1. OAB/UI  - myrbetriq 25  2. BPH/LUTS  - flomax 2018, proscar 2019    PCP - Amrit Brumfield Urologist - Marva 8/28/23, [place] 9/12/19  LOV 3/18/24    He feels well. Appetite is good and energy is low and stable.  He is taking keturah/pro, 25 myrbetriq. No urinary complaints.    He lives at home wife and wife assists with cooking, cleaning. Ambulates with walker in the house and WC outside.    AUA SS is 12/35 with 4 s; 2 u, f; 1 n, w, I, DAMIÁN. Was 4/35 with 2 n; 1 u, f.. Mostly happy with LUTS.  Incontinence: none    UA was negative and PVR is 189 mL - was 252 mL but had him void again and PVR zero.    UTI hx: none  Gross hematuria: none  Tobacco hx: none  Kidney stone hx: none  Fam h/o  malignancy: none  Diarrhea/constipation: uses dulcolax for NL bowel regimen    Poor potency and prefers observation    Drinks ~ 40 oz water, 16 juice with light yellow urine. I encouraged the pt drink at least 40-60 oz water per day or enough to keep urine clear to pale yellow to ensure adequate hydration.    PSA 0.77 3/18/24    Renal US 8/4/19: lobular appearance to both kidneys, medical renal dz, no stones/cysts/other abnormalities    Will consider switching from tamsulosin to silodosin later but wants to keep things as is for now.    Will continue keturah/pro for BPH/LUTS, 25 myrbetriq for OAB, observation for ED. F/u in 1 y for check-up with PVR.    HISTORY:  Past Medical History:    Blood in the stool    Constipation    Eye disease    Fatigue    Frequent use of laxatives    Hemorrhoids    High cholesterol    Irregular bowel habits    Wears glasses      Past Surgical History:   Procedure Laterality Date    Colonoscopy        Family History   Problem Relation Age of Onset    Colon Cancer Father     Heart Attack Father       Social History:   Social History     Socioeconomic History    Marital status:    Tobacco Use     Smoking status: Never    Smokeless tobacco: Never   Vaping Use    Vaping status: Never Used   Substance and Sexual Activity    Alcohol use: Yes     Alcohol/week: 3.0 standard drinks of alcohol     Types: 2 Glasses of wine, 1 Cans of beer per week    Drug use: No   Other Topics Concern    Caffeine Concern No    Exercise No     Social Drivers of Health     Food Insecurity: Low Risk  (8/12/2024)    Received from [facility]    Food Insecurity     Within the past 12 months, you worried that your food would run out before you got money to buy more.  : Never true     Within the past 12 months, the food you bought just didn't last and you didn't have money to get more. : Never true   Transportation Needs: No Transportation Needs (5/10/2025)    Received from [facility]    OASIS : Transportation     Lack of Transportation (Medical): No     Lack of Transportation (Non-Medical): No     Patient Unable or Declines to Respond: No   Stress: Low Risk  (8/12/2024)    Received from [facility]    Stress     Stress is when someone feels tense, nervous, anxious, or can't sleep at night because their mind is troubled. How stressed are you? : A little bit    Received from [facility]    Housing Stability        Medications (Active prior to today's visit):  Current Outpatient Medications   Medication Sig Dispense Refill    predniSONE 20 MG Oral Tab TAKE 2 TABLETS BY MOUTH DAILY WITH BREAKFAST FOR 5 DAYS      QUEtiapine 25 MG Oral Tab Take 0.5 tablets (12.5 mg total) by mouth nightly.      finasteride 5 MG Oral Tab Take 1 tablet (5 mg total) by mouth daily. 90 tablet 5    Mirabegron ER 25 MG Oral Tablet 24 Hr Take 1 tablet (25 mg total) by mouth daily. 90 tablet 5    tamsulosin 0.4 MG Oral Cap Take 1 capsule (0.4 mg total) by mouth daily. 90 capsule 5    ipratropium 0.06 % Nasal Solution 2 sprays by Nasal route in the morning and 2 sprays before bedtime. 1 each 5    ipratropium 0.06  % Nasal Solution 2 sprays by Nasal route in the morning and 2 sprays before bedtime. 1 each 5    bisacodyl 10 MG Rectal Suppos Place 1 suppository (10 mg total) rectally daily as needed.      fluticasone propionate 50 MCG/ACT Nasal Suspension 2 sprays by Nasal route daily. 16 g 3    carvedilol 3.125 MG Oral Tab TAKE 1 TABLET BY MOUTH DAILY BEFORE AND BREAKFAST AS DIRECTED      carvedilol 6.25 MG Oral Tab Take by mouth in the evening.      hydrALAZINE 10 MG Oral Tab Take 1 tablet (10 mg total) by mouth nightly.      docusate sodium 100 MG Oral Cap Take 100 mg by mouth 2 (two) times daily.      Multiple Vitamins-Minerals (MULTIVITAMIN ADULT) Oral Chew Tab Chew 2 tablets by mouth daily.      Omeprazole 20 MG Oral Tablet Delayed Release Dispersible Take 20 mg by mouth daily.      risedronate 35 MG Oral Tab Take 1 tablet (35 mg total) by mouth every 7 days.      SODIUM FLUORIDE 5000 PPM 1.1 % Dental Paste       RYTARY 48. MG Oral Cap CR 1 capsule TID      polyethylene glycol, PEG 3350, 17 g Oral Powd Pack Take 17 g by mouth daily.      acetaminophen 500 MG Oral Tab       RYTARY 23.75-95 MG Oral Cap CR 1 tablet nightly.        Carbidopa-Levodopa ER  MG Oral Tab CR Take  tablets by mouth nightly. Take two a night      GOCOVRI 137 MG Oral Capsule SR 24 Hr Take 137 mg by mouth daily.        atorvastatin 10 MG Oral Tab Take 1 tablet (10 mg total) by mouth daily.      ascorbic acid 250 MG Oral Tab Take 1 tablet (250 mg total) by mouth 2 (two) times daily.      Carbidopa-Levodopa ER 36. MG Oral Cap CR Take 145 mg by mouth 3 (three) times daily.           Allergies:  Allergies   Allergen Reactions    Pramipexole HALLUCINATION and OTHER (SEE COMMENTS)     Confusion   Confusion   Confusion   Confusion   Confusion   Confusion    Pramipexole Dihydrochloride UNKNOWN     Confusion  Confusion           ROS:     A comprehensive 10 point review of systems was completed.  Pertinent positives and negatives noted in  the the HPI.    PHYSICAL EXAM:     GENERAL APPEARANCE: well, developed, well nourished, in no acute distress  NEUROLOGIC: nonfocal, alert and oriented  HEAD: normocephalic, atraumatic  EYES: sclera non-icteric  EARS: hearing intact  ORAL CAVITY: mucosa moist  NECK/THYROID: no obvious goiter or masses  LUNGS: nonlabored breathing  ABDOMEN: soft, no obvious masses or tenderness  SKIN: no obvious rashes    : as noted above    ASSESSMENT/PLAN:   Diagnoses and all orders for this visit:    OAB (overactive bladder)  -     URINALYSIS, AUTO, W/O SCOPE  -     Mirabegron ER 25 MG Oral Tablet 24 Hr; Take 1 tablet (25 mg total) by mouth daily.    BPH with obstruction/lower urinary tract symptoms  -     finasteride 5 MG Oral Tab; Take 1 tablet (5 mg total) by mouth daily.  -     tamsulosin 0.4 MG Oral Cap; Take 1 capsule (0.4 mg total) by mouth daily.    Erectile dysfunction, unspecified erectile dysfunction type    Elevated PSA    - as noted above.    Thanks again for this consult.    Fransisco Young MD, FACS  Urologist  [facility]  Office: [phone]

## 2025-05-13 ENCOUNTER — APPOINTMENT (OUTPATIENT)
Dept: CARDIOLOGY | Age: 85
End: 2025-05-13

## 2025-05-16 ENCOUNTER — APPOINTMENT (OUTPATIENT)
Dept: CARDIOLOGY | Age: 85
End: 2025-05-16

## 2025-05-16 VITALS
SYSTOLIC BLOOD PRESSURE: 118 MMHG | WEIGHT: 170 LBS | HEIGHT: 66 IN | OXYGEN SATURATION: 95 % | DIASTOLIC BLOOD PRESSURE: 74 MMHG | HEART RATE: 70 BPM | BODY MASS INDEX: 27.32 KG/M2

## 2025-05-16 DIAGNOSIS — E78.2 MIXED HYPERLIPIDEMIA: ICD-10-CM

## 2025-05-16 DIAGNOSIS — I25.10 CORONARY ARTERY CALCIFICATION: ICD-10-CM

## 2025-05-16 DIAGNOSIS — G20.A1 PARKINSON'S DISEASE, UNSPECIFIED WHETHER DYSKINESIA PRESENT, UNSPECIFIED WHETHER MANIFESTATIONS FLUCTUATE  (CMD): ICD-10-CM

## 2025-05-16 DIAGNOSIS — I69.30 HISTORY OF STROKE WITH RESIDUAL EFFECTS: ICD-10-CM

## 2025-05-16 DIAGNOSIS — I77.810 DILATED AORTIC ROOT (CMD): ICD-10-CM

## 2025-05-16 DIAGNOSIS — N28.9 RENAL INSUFFICIENCY: ICD-10-CM

## 2025-05-16 DIAGNOSIS — I1A.0 RESISTANT HYPERTENSION: Primary | ICD-10-CM

## 2025-05-18 ENCOUNTER — RESULTS FOLLOW-UP (OUTPATIENT)
Dept: CARDIOLOGY | Age: 85
End: 2025-05-18

## 2025-05-19 ENCOUNTER — OFFICE VISIT (OUTPATIENT)
Dept: SURGERY | Facility: CLINIC | Age: 85
End: 2025-05-19
Payer: MEDICARE

## 2025-05-19 DIAGNOSIS — N52.9 ERECTILE DYSFUNCTION, UNSPECIFIED ERECTILE DYSFUNCTION TYPE: ICD-10-CM

## 2025-05-19 DIAGNOSIS — N13.8 BPH WITH OBSTRUCTION/LOWER URINARY TRACT SYMPTOMS: ICD-10-CM

## 2025-05-19 DIAGNOSIS — R97.20 ELEVATED PSA: ICD-10-CM

## 2025-05-19 DIAGNOSIS — N32.81 OAB (OVERACTIVE BLADDER): Primary | ICD-10-CM

## 2025-05-19 DIAGNOSIS — N40.1 BPH WITH OBSTRUCTION/LOWER URINARY TRACT SYMPTOMS: ICD-10-CM

## 2025-05-19 LAB
APPEARANCE: CLEAR
BILIRUBIN: NEGATIVE
GLUCOSE (URINE DIPSTICK): NEGATIVE MG/DL
KETONES (URINE DIPSTICK): NEGATIVE MG/DL
LEUKOCYTES: NEGATIVE
MULTISTIX LOT#: NORMAL NUMERIC
NITRITE, URINE: NEGATIVE
OCCULT BLOOD: NEGATIVE
PH, URINE: 6 (ref 4.5–8)
PROTEIN (URINE DIPSTICK): NEGATIVE MG/DL
SPECIFIC GRAVITY: 1.01 (ref 1–1.03)
URINE-COLOR: YELLOW
UROBILINOGEN,SEMI-QN: 0.2 MG/DL (ref 0–1.9)

## 2025-05-19 PROCEDURE — 1160F RVW MEDS BY RX/DR IN RCRD: CPT | Performed by: UROLOGY

## 2025-05-19 PROCEDURE — G2211 COMPLEX E/M VISIT ADD ON: HCPCS | Performed by: UROLOGY

## 2025-05-19 PROCEDURE — 99214 OFFICE O/P EST MOD 30 MIN: CPT | Performed by: UROLOGY

## 2025-05-19 PROCEDURE — 1159F MED LIST DOCD IN RCRD: CPT | Performed by: UROLOGY

## 2025-05-19 PROCEDURE — 81003 URINALYSIS AUTO W/O SCOPE: CPT | Performed by: UROLOGY

## 2025-05-19 RX ORDER — TAMSULOSIN HYDROCHLORIDE 0.4 MG/1
0.4 CAPSULE ORAL DAILY
Qty: 90 CAPSULE | Refills: 5 | Status: SHIPPED | OUTPATIENT
Start: 2025-05-19

## 2025-05-19 RX ORDER — QUETIAPINE FUMARATE 25 MG/1
12.5 TABLET, FILM COATED ORAL NIGHTLY
COMMUNITY
Start: 2025-05-10

## 2025-05-19 RX ORDER — MIRABEGRON 25 MG/1
25 TABLET, FILM COATED, EXTENDED RELEASE ORAL DAILY
Qty: 90 TABLET | Refills: 5 | Status: SHIPPED | OUTPATIENT
Start: 2025-05-19

## 2025-05-19 RX ORDER — PREDNISONE 20 MG/1
TABLET ORAL
COMMUNITY
Start: 2025-04-30

## 2025-05-19 RX ORDER — MIRABEGRON 25 MG/1
TABLET, FILM COATED, EXTENDED RELEASE ORAL
COMMUNITY
Start: 2025-04-15 | End: 2025-05-19

## 2025-05-19 RX ORDER — FINASTERIDE 5 MG/1
5 TABLET, FILM COATED ORAL DAILY
Qty: 90 TABLET | Refills: 5 | Status: SHIPPED | OUTPATIENT
Start: 2025-05-19

## 2025-06-25 DIAGNOSIS — N40.1 BPH WITH OBSTRUCTION/LOWER URINARY TRACT SYMPTOMS: ICD-10-CM

## 2025-06-25 DIAGNOSIS — N13.8 BPH WITH OBSTRUCTION/LOWER URINARY TRACT SYMPTOMS: ICD-10-CM

## 2025-06-25 RX ORDER — FINASTERIDE 5 MG/1
5 TABLET, FILM COATED ORAL DAILY
Qty: 90 TABLET | Refills: 5 | Status: SHIPPED | OUTPATIENT
Start: 2025-06-25

## 2025-06-30 ENCOUNTER — TELEPHONE (OUTPATIENT)
Dept: FAMILY MEDICINE | Age: 85
End: 2025-06-30

## 2025-06-30 DIAGNOSIS — R26.89 UNSTABLE BALANCE: ICD-10-CM
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
## 2025-07-14 DIAGNOSIS — N32.81 OAB (OVERACTIVE BLADDER): ICD-10-CM

## 2025-07-15 RX ORDER — MIRABEGRON 25 MG/1
25 TABLET, FILM COATED, EXTENDED RELEASE ORAL DAILY
Qty: 90 TABLET | Refills: 5 | Status: SHIPPED | OUTPATIENT
Start: 2025-07-15

## 2025-08-03 DIAGNOSIS — N40.1 BPH WITH OBSTRUCTION/LOWER URINARY TRACT SYMPTOMS: ICD-10-CM

## 2025-08-03 DIAGNOSIS — N13.8 BPH WITH OBSTRUCTION/LOWER URINARY TRACT SYMPTOMS: ICD-10-CM

## 2025-08-04 RX ORDER — TAMSULOSIN HYDROCHLORIDE 0.4 MG/1
0.4 CAPSULE ORAL DAILY
Qty: 90 CAPSULE | Refills: 5 | Status: SHIPPED | OUTPATIENT
Start: 2025-08-04

## 2025-08-11 SDOH — ECONOMIC STABILITY: HOUSING INSECURITY: WHAT IS YOUR LIVING SITUATION TODAY?: I HAVE A STEADY PLACE TO LIVE

## 2025-08-11 SDOH — ECONOMIC STABILITY: HOUSING INSECURITY: DO YOU HAVE PROBLEMS WITH ANY OF THE FOLLOWING?: NONE OF THE ABOVE

## 2025-08-11 SDOH — ECONOMIC STABILITY: FOOD INSECURITY: WITHIN THE PAST 12 MONTHS, THE FOOD YOU BOUGHT JUST DIDN'T LAST AND YOU DIDN'T HAVE MONEY TO GET MORE.: NEVER TRUE

## 2025-08-11 ASSESSMENT — PATIENT HEALTH QUESTIONNAIRE - PHQ9
2. FEELING DOWN, DEPRESSED OR HOPELESS: NOT AT ALL
1. LITTLE INTEREST OR PLEASURE IN DOING THINGS: NOT AT ALL
SUM OF ALL RESPONSES TO PHQ9 QUESTIONS 1 AND 2: 0
CLINICAL INTERPRETATION OF PHQ2 SCORE: 0
CLINICAL INTERPRETATION OF PHQ2 SCORE: NO FURTHER SCREENING NEEDED

## 2025-08-11 ASSESSMENT — SOCIAL DETERMINANTS OF HEALTH (SDOH): IN THE PAST 12 MONTHS, HAS THE ELECTRIC, GAS, OIL, OR WATER COMPANY THREATENED TO SHUT OFF SERVICE IN YOUR HOME?: NO

## 2025-08-14 ENCOUNTER — APPOINTMENT (OUTPATIENT)
Dept: FAMILY MEDICINE | Age: 85
End: 2025-08-14

## 2025-08-14 ENCOUNTER — LAB SERVICES (OUTPATIENT)
Dept: LAB | Age: 85
End: 2025-08-14

## 2025-08-14 VITALS
DIASTOLIC BLOOD PRESSURE: 72 MMHG | SYSTOLIC BLOOD PRESSURE: 104 MMHG | TEMPERATURE: 97.2 F | HEART RATE: 63 BPM | HEIGHT: 66 IN | BODY MASS INDEX: 27.44 KG/M2

## 2025-08-14 DIAGNOSIS — N18.32 CHRONIC KIDNEY DISEASE, STAGE 3B  (CMD): ICD-10-CM

## 2025-08-14 DIAGNOSIS — I10 ESSENTIAL HYPERTENSION: ICD-10-CM

## 2025-08-14 DIAGNOSIS — G20.B1 PARKINSON'S DISEASE WITH DYSKINESIA WITHOUT FLUCTUATING MANIFESTATIONS  (CMD): ICD-10-CM

## 2025-08-14 DIAGNOSIS — Z00.00 ENCOUNTER FOR PREVENTIVE HEALTH EXAMINATION: Primary | ICD-10-CM

## 2025-08-14 DIAGNOSIS — F09 COGNITIVE DYSFUNCTION: ICD-10-CM

## 2025-08-14 DIAGNOSIS — E78.5 HYPERLIPIDEMIA, UNSPECIFIED HYPERLIPIDEMIA TYPE: ICD-10-CM

## 2025-08-14 DIAGNOSIS — G24.4 OROFACIAL DYSKINESIA: ICD-10-CM

## 2025-08-14 DIAGNOSIS — K59.01 SLOW TRANSIT CONSTIPATION: ICD-10-CM

## 2025-08-14 DIAGNOSIS — Z00.00 ENCOUNTER FOR ANNUAL WELLNESS VISIT (AWV) IN MEDICARE PATIENT: ICD-10-CM

## 2025-08-14 LAB
APPEARANCE UR: CLEAR
BILIRUB UR QL STRIP: NEGATIVE
COLOR UR: NORMAL
GLUCOSE UR STRIP-MCNC: NEGATIVE MG/DL
HGB UR QL STRIP: NEGATIVE
KETONES UR STRIP-MCNC: NEGATIVE MG/DL
LEUKOCYTE ESTERASE UR QL STRIP: NEGATIVE
NITRITE UR QL STRIP: NEGATIVE
PH UR STRIP: 6 [PH] (ref 5–7)
PROT UR STRIP-MCNC: NEGATIVE MG/DL
SP GR UR STRIP: 1.01 (ref 1–1.03)
UROBILINOGEN UR STRIP-MCNC: 0.2 MG/DL

## 2025-08-14 ASSESSMENT — COGNITIVE AND FUNCTIONAL STATUS - GENERAL
DO YOU HAVE DIFFICULTY DRESSING OR BATHING: YES
BECAUSE OF A PHYSICAL, MENTAL, OR EMOTIONAL CONDITION, DO YOU HAVE SERIOUS DIFFICULTY CONCENTRATING, REMEMBERING OR MAKING DECISIONS: YES
DO YOU HAVE SERIOUS DIFFICULTY WALKING OR CLIMBING STAIRS: YES
BECAUSE OF A PHYSICAL, MENTAL, OR EMOTIONAL CONDITION, DO YOU HAVE DIFFICULTY DOING ERRANDS ALONE: YES

## 2025-08-14 ASSESSMENT — PATIENT HEALTH QUESTIONNAIRE - PHQ9
SUM OF ALL RESPONSES TO PHQ9 QUESTIONS 1 AND 2: 0
1. LITTLE INTEREST OR PLEASURE IN DOING THINGS: NOT AT ALL
2. FEELING DOWN, DEPRESSED OR HOPELESS: NOT AT ALL
SUM OF ALL RESPONSES TO PHQ9 QUESTIONS 1 AND 2: 0
CLINICAL INTERPRETATION OF PHQ2 SCORE: NO FURTHER SCREENING NEEDED

## 2025-08-19 ENCOUNTER — APPOINTMENT (OUTPATIENT)
Dept: DERMATOLOGY | Age: 85
End: 2025-08-19

## 2025-08-19 DIAGNOSIS — L21.9 SEBORRHEIC DERMATITIS: Primary | ICD-10-CM

## 2025-08-19 PROCEDURE — 99214 OFFICE O/P EST MOD 30 MIN: CPT | Performed by: DERMATOLOGY

## 2025-08-19 RX ORDER — CLOBETASOL PROPIONATE 0.05 G/100ML
SHAMPOO TOPICAL
Qty: 236 ML | Refills: 1 | Status: SHIPPED | OUTPATIENT
Start: 2025-08-19

## 2025-08-19 RX ORDER — KETOCONAZOLE 20 MG/G
CREAM TOPICAL
Qty: 60 G | Refills: 2 | Status: SHIPPED | OUTPATIENT
Start: 2025-08-19

## 2025-11-18 ENCOUNTER — APPOINTMENT (OUTPATIENT)
Dept: CARDIOLOGY | Age: 85
End: 2025-11-18

## 2026-02-12 ENCOUNTER — APPOINTMENT (OUTPATIENT)
Dept: FAMILY MEDICINE | Age: 86
End: 2026-02-12

## 2026-08-06 ENCOUNTER — APPOINTMENT (OUTPATIENT)
Dept: FAMILY MEDICINE | Age: 86
End: 2026-08-06

## (undated) DEVICE — NEEDLE SPINAL 22X5 405148

## (undated) DEVICE — BANDAID COVERLET 1X3

## (undated) DEVICE — REMOVER DURAPREP 3M

## (undated) DEVICE — GLOVE SURG SENSICARE SZ 7-1/2

## (undated) DEVICE — MARKER SKIN 2 TIP

## (undated) DEVICE — NEEDLE SPINAL 22X3-1/2 BLK

## (undated) DEVICE — GLOVE SURG SENSICARE SZ 7

## (undated) DEVICE — PAIN TRAY: Brand: MEDLINE INDUSTRIES, INC.